# Patient Record
Sex: FEMALE | Race: WHITE | NOT HISPANIC OR LATINO
[De-identification: names, ages, dates, MRNs, and addresses within clinical notes are randomized per-mention and may not be internally consistent; named-entity substitution may affect disease eponyms.]

---

## 2022-07-05 ENCOUNTER — APPOINTMENT (OUTPATIENT)
Dept: CARDIOTHORACIC SURGERY | Facility: CLINIC | Age: 47
End: 2022-07-05

## 2022-07-05 VITALS
OXYGEN SATURATION: 98 % | DIASTOLIC BLOOD PRESSURE: 75 MMHG | SYSTOLIC BLOOD PRESSURE: 117 MMHG | RESPIRATION RATE: 14 BRPM | TEMPERATURE: 98.3 F | HEART RATE: 60 BPM

## 2022-07-05 VITALS — BODY MASS INDEX: 20.89 KG/M2 | WEIGHT: 130 LBS | HEIGHT: 66 IN

## 2022-07-05 DIAGNOSIS — E03.9 HYPOTHYROIDISM, UNSPECIFIED: ICD-10-CM

## 2022-07-05 DIAGNOSIS — Z86.39 PERSONAL HISTORY OF OTHER ENDOCRINE, NUTRITIONAL AND METABOLIC DISEASE: ICD-10-CM

## 2022-07-05 DIAGNOSIS — R42 DIZZINESS AND GIDDINESS: ICD-10-CM

## 2022-07-05 DIAGNOSIS — Z82.49 FAMILY HISTORY OF ISCHEMIC HEART DISEASE AND OTHER DISEASES OF THE CIRCULATORY SYSTEM: ICD-10-CM

## 2022-07-05 DIAGNOSIS — Z95.2 PRESENCE OF PROSTHETIC HEART VALVE: ICD-10-CM

## 2022-07-05 DIAGNOSIS — E78.5 HYPERLIPIDEMIA, UNSPECIFIED: ICD-10-CM

## 2022-07-05 DIAGNOSIS — Q23.1 CONGENITAL INSUFFICIENCY OF AORTIC VALVE: ICD-10-CM

## 2022-07-05 DIAGNOSIS — R00.2 PALPITATIONS: ICD-10-CM

## 2022-07-05 DIAGNOSIS — Z87.09 PERSONAL HISTORY OF OTHER DISEASES OF THE RESPIRATORY SYSTEM: ICD-10-CM

## 2022-07-05 DIAGNOSIS — Z87.11 PERSONAL HISTORY OF PEPTIC ULCER DISEASE: ICD-10-CM

## 2022-07-05 PROCEDURE — 99244 OFF/OP CNSLTJ NEW/EST MOD 40: CPT

## 2022-07-05 RX ORDER — NORETHINDRONE ACETATE AND ETHINYL ESTRADIOL 1MG-20(21)
1-20 KIT ORAL
Qty: 84 | Refills: 0 | Status: ACTIVE | COMMUNITY
Start: 2021-11-22

## 2022-07-05 RX ORDER — LEVOTHYROXINE SODIUM 88 UG/1
88 TABLET ORAL
Qty: 90 | Refills: 0 | Status: ACTIVE | COMMUNITY
Start: 2022-06-24

## 2022-07-06 NOTE — REVIEW OF SYSTEMS
[Feeling Tired] : feeling tired [SOB on Exertion] : shortness of breath during exertion [Dizziness] : dizziness [Anxiety] : anxiety [Negative] : Heme/Lymph [Chest Pain] : no chest pain [Palpitations] : no palpitations [Lower Ext Edema] : no lower extremity edema [Abdominal Pain] : no abdominal pain [Vomiting] : no vomiting [Joint Swelling] : no joint swelling [Skin Lesions] : no skin lesions [Fainting] : no fainting

## 2022-07-06 NOTE — PHYSICAL EXAM
[General Appearance - Alert] : alert [Sclera] : the sclera and conjunctiva were normal [Jugular Venous Distention Increased] : there was no jugular-venous distention [] : no respiratory distress [Respiration, Rhythm And Depth] : normal respiratory rhythm and effort [Auscultation Breath Sounds / Voice Sounds] : lungs were clear to auscultation bilaterally [Prosthetic Aortic Valve] : prosthetic aortic valve heard [II] : a grade 2 [No Pitting Edema] : no pitting edema present [Surgical / Traumatic Scar Sternum] : a scar [Bowel Sounds] : normal bowel sounds [Abdomen Soft] : soft [Cervical Lymph Nodes Enlarged Posterior Bilaterally] : posterior cervical [No CVA Tenderness] : no ~M costovertebral angle tenderness [Abnormal Walk] : normal gait [Skin Color & Pigmentation] : normal skin color and pigmentation [No Focal Deficits] : no focal deficits [Oriented To Time, Place, And Person] : oriented to person, place, and time [Right Carotid Bruit] : no bruit heard over the right carotid [Left Carotid Bruit] : no bruit heard over the left carotid

## 2022-07-06 NOTE — HISTORY OF PRESENT ILLNESS
[FreeTextEntry1] : Molly is a 46 year old female with PMH of Grave's disease (on Synthroid now), HLD, asthma, gastric ulcers? and bicuspid aortic valve s/p AVR in 2008 with Dr. Ata Montes at [facility]. \par \par She follows with Dr. Maxwell Walker for her cardiology care. Recent echocardiogram stress test  on 6/23/2022 demonstrated EF 55%, abnormally functioning porcine bioprosthetic aortic valve, with severe stenosis, AoV 4.83 m/sec, pGr 93.3 mmHg, mGr 53.5 mmHg, LEANDRO 0.7 cm² She was referred for surgical consultation. \par \par She reports "not feeling well" for approximately 1 year ago and reports positional dizziness. She is a  working in a local school district.  She is still working out although cautiously. She reports her symptoms have intensified in recent months. She is a mother to 3 young boys.

## 2022-07-06 NOTE — ASSESSMENT
[FreeTextEntry1] : Molly is a 46 year old female with PMH of Grave's disease (on Synthroid now), HLD, asthma, gastric ulcers? and bicuspid aortic valve s/p AVR in 2008. She follows with Dr. Maxwell Walker for her cardiology care. Recent echocardiogram stress test  on 6/23/2022 demonstrated EF 55%, abnormally functioning porcine bioprosthetic aortic valve, with severe stenosis, AoV 4.83 m/sec, pGr 93.3 mmHg, mGr 53.5 mmHg, LEANDRO 0.7 cm² She was referred for surgical consultation. She reports "not feeling well" for approximately 1 year ago and reports dizziness (NYHA II). She is still working out although cautiously. \par \par She reports her symptoms have intensified in recent months however her  has been ill for the last three years and she has had to put her focus on her  and her family.  \par \par I have reviewed the patient's medical records, diagnostic images during the time of this office consultation and have made the following recommendation. Review of the imaging shows stenosis of bioprosthetic aortic valve which will require surgical intervention. We discussed varying approaches (Ross procedure, Valve in Valve). We discussed PPM risk which I quote a 10%. I would recommend redo sternotomy and aortic valve replacement for now. \par \par All questions were answered and concerns addressed. She would like to proceed with surgery in September due to her family responsibilities. I explained to patient that I would recommend her having surgery sooner (stenotic bioprosthetic aortic valve) due to recent echocardiogram results and her worsening symptomatology. \par \par Patient however is apprehensive due to her three young sons being home for the summer and her  has recently recovered from a prolonged neurological illness.  I instructed patient to keep hydrated during the next two months and restrict her physical activity. If her symptoms worsen I have instructed Molly to call my office for re evaluation and expediting open surgery.  All questions were answered and concerns addressed.    \par \par Plan:\par 1) CTA of Coronaries \par 2) Redo Aortic valve replacement\par \par

## 2022-07-06 NOTE — DATA REVIEWED
[FreeTextEntry1] : Echocardiogram stress test  on 6/23/2022 demonstrated EF 55%, abnormally functioning porcine bioprosthetic aortic valve, with severe stenosis, AoV 4.83 m/sec, pGr 93.3 mmHg, mGr 53.5 mmHg, LEANDRO 0.7 cm²

## 2022-07-06 NOTE — END OF VISIT
[FreeTextEntry2] : I personally performed the services described in the documentation, reviewed the documentation recorded by the scribe in my presence and it accurately and completely records my words and actions.\par \par I, Bre Mathew NP, am scribing for and the presence of Dr. Jeremias Stevenson, the following sections HISTORY OF PRESENT ILLNESS, PAST MEDICAL/FAMILY/SOCIAL HISTORY; REVIEW OF SYSTEMS; VITAL SIGNS; PHYSICAL EXAM; DISPOSITION.\par

## 2022-07-20 ENCOUNTER — OUTPATIENT (OUTPATIENT)
Dept: OUTPATIENT SERVICES | Facility: HOSPITAL | Age: 47
LOS: 1 days | End: 2022-07-20
Payer: COMMERCIAL

## 2022-07-20 ENCOUNTER — APPOINTMENT (OUTPATIENT)
Dept: CARDIOLOGY | Facility: CLINIC | Age: 47
End: 2022-07-20

## 2022-07-20 DIAGNOSIS — Q23.1 CONGENITAL INSUFFICIENCY OF AORTIC VALVE: ICD-10-CM

## 2022-07-20 DIAGNOSIS — Z00.00 ENCOUNTER FOR GENERAL ADULT MEDICAL EXAMINATION WITHOUT ABNORMAL FINDINGS: ICD-10-CM

## 2022-07-20 DIAGNOSIS — R42 DIZZINESS AND GIDDINESS: ICD-10-CM

## 2022-07-20 PROCEDURE — 75574 CT ANGIO HRT W/3D IMAGE: CPT | Mod: 26

## 2022-07-20 PROCEDURE — 75574 CT ANGIO HRT W/3D IMAGE: CPT

## 2022-09-12 ENCOUNTER — OUTPATIENT (OUTPATIENT)
Dept: OUTPATIENT SERVICES | Facility: HOSPITAL | Age: 47
LOS: 1 days | End: 2022-09-12

## 2022-09-12 ENCOUNTER — OUTPATIENT (OUTPATIENT)
Dept: OUTPATIENT SERVICES | Facility: HOSPITAL | Age: 47
LOS: 1 days | End: 2022-09-12
Payer: COMMERCIAL

## 2022-09-12 VITALS
RESPIRATION RATE: 16 BRPM | SYSTOLIC BLOOD PRESSURE: 101 MMHG | HEIGHT: 66 IN | DIASTOLIC BLOOD PRESSURE: 70 MMHG | TEMPERATURE: 98 F | HEART RATE: 67 BPM | WEIGHT: 136.91 LBS | OXYGEN SATURATION: 100 %

## 2022-09-12 DIAGNOSIS — Z87.74 PERSONAL HISTORY OF (CORRECTED) CONGENITAL MALFORMATIONS OF HEART AND CIRCULATORY SYSTEM: Chronic | ICD-10-CM

## 2022-09-12 DIAGNOSIS — Z11.52 ENCOUNTER FOR SCREENING FOR COVID-19: ICD-10-CM

## 2022-09-12 DIAGNOSIS — I35.0 NONRHEUMATIC AORTIC (VALVE) STENOSIS: ICD-10-CM

## 2022-09-12 DIAGNOSIS — Z01.818 ENCOUNTER FOR OTHER PREPROCEDURAL EXAMINATION: ICD-10-CM

## 2022-09-12 DIAGNOSIS — Z95.2 PRESENCE OF PROSTHETIC HEART VALVE: Chronic | ICD-10-CM

## 2022-09-12 DIAGNOSIS — E06.3 AUTOIMMUNE THYROIDITIS: ICD-10-CM

## 2022-09-12 DIAGNOSIS — Z98.891 HISTORY OF UTERINE SCAR FROM PREVIOUS SURGERY: Chronic | ICD-10-CM

## 2022-09-12 DIAGNOSIS — Z87.19 PERSONAL HISTORY OF OTHER DISEASES OF THE DIGESTIVE SYSTEM: ICD-10-CM

## 2022-09-12 LAB
A1C WITH ESTIMATED AVERAGE GLUCOSE RESULT: 5.1 % — SIGNIFICANT CHANGE UP (ref 4–5.6)
ANION GAP SERPL CALC-SCNC: 11 MMOL/L — SIGNIFICANT CHANGE UP (ref 5–17)
BLD GP AB SCN SERPL QL: NEGATIVE — SIGNIFICANT CHANGE UP
BUN SERPL-MCNC: 25 MG/DL — HIGH (ref 7–23)
CALCIUM SERPL-MCNC: 9.3 MG/DL — SIGNIFICANT CHANGE UP (ref 8.4–10.5)
CHLORIDE SERPL-SCNC: 105 MMOL/L — SIGNIFICANT CHANGE UP (ref 96–108)
CO2 SERPL-SCNC: 24 MMOL/L — SIGNIFICANT CHANGE UP (ref 22–31)
CREAT SERPL-MCNC: 0.85 MG/DL — SIGNIFICANT CHANGE UP (ref 0.5–1.3)
EGFR: 86 ML/MIN/1.73M2 — SIGNIFICANT CHANGE UP
ESTIMATED AVERAGE GLUCOSE: 100 MG/DL — SIGNIFICANT CHANGE UP (ref 68–114)
GLUCOSE SERPL-MCNC: 83 MG/DL — SIGNIFICANT CHANGE UP (ref 70–99)
HCT VFR BLD CALC: 42.7 % — SIGNIFICANT CHANGE UP (ref 34.5–45)
HGB BLD-MCNC: 14.7 G/DL — SIGNIFICANT CHANGE UP (ref 11.5–15.5)
MCHC RBC-ENTMCNC: 31.7 PG — SIGNIFICANT CHANGE UP (ref 27–34)
MCHC RBC-ENTMCNC: 34.4 GM/DL — SIGNIFICANT CHANGE UP (ref 32–36)
MCV RBC AUTO: 92 FL — SIGNIFICANT CHANGE UP (ref 80–100)
MRSA PCR RESULT.: SIGNIFICANT CHANGE UP
NRBC # BLD: 0 /100 WBCS — SIGNIFICANT CHANGE UP (ref 0–0)
PLATELET # BLD AUTO: 175 K/UL — SIGNIFICANT CHANGE UP (ref 150–400)
POTASSIUM SERPL-MCNC: 4 MMOL/L — SIGNIFICANT CHANGE UP (ref 3.5–5.3)
POTASSIUM SERPL-SCNC: 4 MMOL/L — SIGNIFICANT CHANGE UP (ref 3.5–5.3)
RBC # BLD: 4.64 M/UL — SIGNIFICANT CHANGE UP (ref 3.8–5.2)
RBC # FLD: 13 % — SIGNIFICANT CHANGE UP (ref 10.3–14.5)
RH IG SCN BLD-IMP: NEGATIVE — SIGNIFICANT CHANGE UP
S AUREUS DNA NOSE QL NAA+PROBE: SIGNIFICANT CHANGE UP
SARS-COV-2 RNA SPEC QL NAA+PROBE: SIGNIFICANT CHANGE UP
SODIUM SERPL-SCNC: 140 MMOL/L — SIGNIFICANT CHANGE UP (ref 135–145)
WBC # BLD: 6.13 K/UL — SIGNIFICANT CHANGE UP (ref 3.8–10.5)
WBC # FLD AUTO: 6.13 K/UL — SIGNIFICANT CHANGE UP (ref 3.8–10.5)

## 2022-09-12 PROCEDURE — 87641 MR-STAPH DNA AMP PROBE: CPT

## 2022-09-12 PROCEDURE — 86901 BLOOD TYPING SEROLOGIC RH(D): CPT

## 2022-09-12 PROCEDURE — G0463: CPT

## 2022-09-12 PROCEDURE — 83036 HEMOGLOBIN GLYCOSYLATED A1C: CPT

## 2022-09-12 PROCEDURE — U0005: CPT

## 2022-09-12 PROCEDURE — U0003: CPT

## 2022-09-12 PROCEDURE — 71046 X-RAY EXAM CHEST 2 VIEWS: CPT | Mod: 26

## 2022-09-12 PROCEDURE — 71046 X-RAY EXAM CHEST 2 VIEWS: CPT

## 2022-09-12 PROCEDURE — 86900 BLOOD TYPING SEROLOGIC ABO: CPT

## 2022-09-12 PROCEDURE — 85027 COMPLETE CBC AUTOMATED: CPT

## 2022-09-12 PROCEDURE — 87640 STAPH A DNA AMP PROBE: CPT

## 2022-09-12 PROCEDURE — C9803: CPT

## 2022-09-12 PROCEDURE — 86850 RBC ANTIBODY SCREEN: CPT

## 2022-09-12 PROCEDURE — 80048 BASIC METABOLIC PNL TOTAL CA: CPT

## 2022-09-12 RX ORDER — CEFUROXIME AXETIL 250 MG
1500 TABLET ORAL ONCE
Refills: 0 | Status: COMPLETED | OUTPATIENT
Start: 2022-09-14 | End: 2022-09-14

## 2022-09-12 NOTE — H&P PST ADULT - NSICDXPASTMEDICALHX_GEN_ALL_CORE_FT
PAST MEDICAL HISTORY:  H/O bicuspid aortic valve      PAST MEDICAL HISTORY:  Asthma well controlled - not taking medication at this time    H/O bicuspid aortic valve     H/O constipation severe - caused by opiods - requires stools softeners when taking pain medicatons    Hashimoto's disease Can only take Synthoid - no generics

## 2022-09-12 NOTE — H&P PST ADULT - LAST ECHOCARDIOGRAM
6/23/22 - EF 50-55%. abnormally functioning porcine bioprosthetic aortic valve, severe stenosis of the bioprosthetic aortic valve

## 2022-09-12 NOTE — H&P PST ADULT - HISTORY OF PRESENT ILLNESS
45 yo female Hashimoto's controlled with synthroid,   symptoms of sob, tiredness, dizziness, weight lifting, cardio and walked 3 miles a day, walks 45 mts.  no covid  male with PMH of Grave's disease (on Synthroid now), HLD, asthma, gastric ulcers? and bicuspid aortic valve s/p AVR in 2008 with Dr. Ata Montes at [facility].     She follows with Dr. Maxwell Walker for her cardiology care. Recent echocardiogram stress test on 6/23/2022 demonstrated EF 55%, abnormally functioning porcine bioprosthetic aortic valve, with severe stenosis, AoV 4.83 m/sec, pGr 93.3 mmHg, mGr 53.5 mmHg, LEANDRO 0.7 cm² She was referred for surgical consultation.     She reports "not feeling well" for approximately 1 year ago and reports positional dizziness. 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 She reports her symptoms have intensified in recent months. She is a mother to 3 young boys.  47 yo female PMH asthma - well controlled - no meds,  Hashimoto's controlled with Synthroid, bicuspid aortic valve - s/p AVR 2008, pt. reports symptoms of SOB, tiredness, dizziness for about a year, last echocardiogram revealed severe stenosis of the bioprosthetic aortic valve, pt. had CT Angio of the heart and coronaries 7/2022 - WNL. Pt. denies COVID-19 infection in 2020 through 2022, denies close contact with anyone that has been ill, no fever, cough, dyspnea in past two weeks.    Pt. had COVID swab done today at [facility]

## 2022-09-12 NOTE — H&P PST ADULT - ASSESSMENT
NICOLASI VTE 2.0 SCORE [CLOT updated 2019]    AGE RELATED RISK FACTORS                                                       MOBILITY RELATED FACTORS  [x ] Age 41-60 years                                            (1 Point)                    [ ] Bed rest                                                        (1 Point)  [ ] Age: 61-74 years                                           (2 Points)                  [ ] Plaster cast                                                   (2 Points)  [ ] Age= 75 years                                              (3 Points)                    [ ] Bed bound for more than 72 hours                 (2 Points)    DISEASE RELATED RISK FACTORS                                               GENDER SPECIFIC FACTORS  [ ] Edema in the lower extremities                       (1 Point)              [ ] Pregnancy                                                     (1 Point)  [ ] Varicose veins                                               (1 Point)                     [ ] Post-partum < 6 weeks                                   (1 Point)             [ x] BMI > 25 Kg/m2                                            (1 Point)                     [ ] Hormonal therapy  or oral contraception          (1 Point)                 [ ] Sepsis (in the previous month)                        (1 Point)               [ ] History of pregnancy complications                 (1 point)  [ ] Pneumonia or serious lung disease                                               [ ] Unexplained or recurrent                     (1 Point)           (in the previous month)                               (1 Point)  [ ] Abnormal pulmonary function test                     (1 Point)                 SURGERY RELATED RISK FACTORS  [ ] Acute myocardial infarction                              (1 Point)               [ ]  Section                                             (1 Point)  [ ] Congestive heart failure (in the previous month)  (1 Point)      [ ] Minor surgery                                                  (1 Point)   [ ] Inflammatory bowel disease                             (1 Point)               [ ] Arthroscopic surgery                                        (2 Points)  [ ] Central venous access                                      (2 Points)                [x ] General surgery lasting more than 45 minutes (2 points)  [ ] Malignancy- Present or previous                   (2 Points)                [ ] Elective arthroplasty                                         (5 points)    [ ] Stroke (in the previous month)                          (5 Points)                                                                                                                                                           HEMATOLOGY RELATED FACTORS                                                 TRAUMA RELATED RISK FACTORS  [ ] Prior episodes of VTE                                     (3 Points)                [ ] Fracture of the hip, pelvis, or leg                       (5 Points)  [ ] Positive family history for VTE                         (3 Points)             [ ] Acute spinal cord injury (in the previous month)  (5 Points)  [ ] Prothrombin 49334 A                                     (3 Points)               [ ] Paralysis  (less than 1 month)                             (5 Points)  [ ] Factor V Leiden                                             (3 Points)                  [ ] Multiple Trauma within 1 month                        (5 Points)  [ ] Lupus anticoagulants                                     (3 Points)                                                           [ ] Anticardiolipin antibodies                               (3 Points)                                                       [ ] High homocysteine in the blood                      (3 Points)                                             [ ] Other congenital or acquired thrombophilia      (3 Points)                                                [ ] Heparin induced thrombocytopenia                  (3 Points)                                     Total Score [    4      ]

## 2022-09-13 ENCOUNTER — TRANSCRIPTION ENCOUNTER (OUTPATIENT)
Age: 47
End: 2022-09-13

## 2022-09-13 PROBLEM — E06.3 AUTOIMMUNE THYROIDITIS: Chronic | Status: ACTIVE | Noted: 2022-09-12

## 2022-09-13 PROBLEM — Z87.19 PERSONAL HISTORY OF OTHER DISEASES OF THE DIGESTIVE SYSTEM: Chronic | Status: ACTIVE | Noted: 2022-09-12

## 2022-09-13 PROBLEM — Z87.74 PERSONAL HISTORY OF (CORRECTED) CONGENITAL MALFORMATIONS OF HEART AND CIRCULATORY SYSTEM: Chronic | Status: ACTIVE | Noted: 2022-09-12

## 2022-09-13 PROBLEM — J45.909 UNSPECIFIED ASTHMA, UNCOMPLICATED: Chronic | Status: ACTIVE | Noted: 2022-09-12

## 2022-09-14 ENCOUNTER — RESULT REVIEW (OUTPATIENT)
Age: 47
End: 2022-09-14

## 2022-09-14 ENCOUNTER — INPATIENT (INPATIENT)
Facility: HOSPITAL | Age: 47
LOS: 5 days | Discharge: HOME HEALTH | DRG: 219 | End: 2022-09-20
Attending: THORACIC SURGERY (CARDIOTHORACIC VASCULAR SURGERY) | Admitting: THORACIC SURGERY (CARDIOTHORACIC VASCULAR SURGERY)
Payer: COMMERCIAL

## 2022-09-14 ENCOUNTER — APPOINTMENT (OUTPATIENT)
Dept: CARDIOTHORACIC SURGERY | Facility: HOSPITAL | Age: 47
End: 2022-09-14

## 2022-09-14 VITALS
TEMPERATURE: 98 F | OXYGEN SATURATION: 100 % | DIASTOLIC BLOOD PRESSURE: 68 MMHG | RESPIRATION RATE: 16 BRPM | HEART RATE: 55 BPM | SYSTOLIC BLOOD PRESSURE: 106 MMHG | WEIGHT: 139.11 LBS | HEIGHT: 66 IN

## 2022-09-14 DIAGNOSIS — Z87.74 PERSONAL HISTORY OF (CORRECTED) CONGENITAL MALFORMATIONS OF HEART AND CIRCULATORY SYSTEM: Chronic | ICD-10-CM

## 2022-09-14 DIAGNOSIS — I35.0 NONRHEUMATIC AORTIC (VALVE) STENOSIS: ICD-10-CM

## 2022-09-14 DIAGNOSIS — Z98.891 HISTORY OF UTERINE SCAR FROM PREVIOUS SURGERY: Chronic | ICD-10-CM

## 2022-09-14 LAB
ALBUMIN SERPL ELPH-MCNC: 3 G/DL — LOW (ref 3.3–5)
ALP SERPL-CCNC: 21 U/L — LOW (ref 40–120)
ALT FLD-CCNC: 11 U/L — SIGNIFICANT CHANGE UP (ref 10–45)
ANION GAP SERPL CALC-SCNC: 11 MMOL/L — SIGNIFICANT CHANGE UP (ref 5–17)
APTT BLD: 25.6 SEC — LOW (ref 27.5–35.5)
AST SERPL-CCNC: 27 U/L — SIGNIFICANT CHANGE UP (ref 10–40)
BASE EXCESS BLDA CALC-SCNC: -2.6 MMOL/L — LOW (ref -2–3)
BASE EXCESS BLDV CALC-SCNC: -0.1 MMOL/L — SIGNIFICANT CHANGE UP (ref -2–3)
BASE EXCESS BLDV CALC-SCNC: -1.3 MMOL/L — SIGNIFICANT CHANGE UP (ref -2–3)
BASE EXCESS BLDV CALC-SCNC: -1.4 MMOL/L — SIGNIFICANT CHANGE UP (ref -2–3)
BASE EXCESS BLDV CALC-SCNC: -1.4 MMOL/L — SIGNIFICANT CHANGE UP (ref -2–3)
BASE EXCESS BLDV CALC-SCNC: -1.5 MMOL/L — SIGNIFICANT CHANGE UP (ref -2–3)
BASE EXCESS BLDV CALC-SCNC: -3 MMOL/L — LOW (ref -2–3)
BASOPHILS # BLD AUTO: 0.03 K/UL — SIGNIFICANT CHANGE UP (ref 0–0.2)
BASOPHILS NFR BLD AUTO: 0.2 % — SIGNIFICANT CHANGE UP (ref 0–2)
BILIRUB SERPL-MCNC: 0.8 MG/DL — SIGNIFICANT CHANGE UP (ref 0.2–1.2)
BLOOD GAS VENOUS - CREATININE: SIGNIFICANT CHANGE UP MG/DL (ref 0.5–1.3)
BUN SERPL-MCNC: 20 MG/DL — SIGNIFICANT CHANGE UP (ref 7–23)
CA-I SERPL-SCNC: 0.75 MMOL/L — LOW (ref 1.15–1.33)
CA-I SERPL-SCNC: 0.77 MMOL/L — LOW (ref 1.15–1.33)
CA-I SERPL-SCNC: 0.81 MMOL/L — LOW (ref 1.15–1.33)
CA-I SERPL-SCNC: 0.82 MMOL/L — LOW (ref 1.15–1.33)
CA-I SERPL-SCNC: 0.84 MMOL/L — LOW (ref 1.15–1.33)
CA-I SERPL-SCNC: 0.87 MMOL/L — LOW (ref 1.15–1.33)
CALCIUM SERPL-MCNC: 8.6 MG/DL — SIGNIFICANT CHANGE UP (ref 8.4–10.5)
CHLORIDE BLDV-SCNC: 103 MMOL/L — SIGNIFICANT CHANGE UP (ref 96–108)
CHLORIDE BLDV-SCNC: 105 MMOL/L — SIGNIFICANT CHANGE UP (ref 96–108)
CHLORIDE BLDV-SCNC: 106 MMOL/L — SIGNIFICANT CHANGE UP (ref 96–108)
CHLORIDE SERPL-SCNC: 108 MMOL/L — SIGNIFICANT CHANGE UP (ref 96–108)
CO2 BLDA-SCNC: 24 MMOL/L — SIGNIFICANT CHANGE UP (ref 19–24)
CO2 BLDV-SCNC: 23 MMOL/L — SIGNIFICANT CHANGE UP (ref 22–26)
CO2 BLDV-SCNC: 24 MMOL/L — SIGNIFICANT CHANGE UP (ref 22–26)
CO2 BLDV-SCNC: 24 MMOL/L — SIGNIFICANT CHANGE UP (ref 22–26)
CO2 BLDV-SCNC: 26 MMOL/L — SIGNIFICANT CHANGE UP (ref 22–26)
CO2 BLDV-SCNC: 27 MMOL/L — HIGH (ref 22–26)
CO2 BLDV-SCNC: 30 MMOL/L — HIGH (ref 22–26)
CO2 SERPL-SCNC: 23 MMOL/L — SIGNIFICANT CHANGE UP (ref 22–31)
CREAT SERPL-MCNC: 0.73 MG/DL — SIGNIFICANT CHANGE UP (ref 0.5–1.3)
EGFR: 103 ML/MIN/1.73M2 — SIGNIFICANT CHANGE UP
EOSINOPHIL # BLD AUTO: 0.02 K/UL — SIGNIFICANT CHANGE UP (ref 0–0.5)
EOSINOPHIL NFR BLD AUTO: 0.2 % — SIGNIFICANT CHANGE UP (ref 0–6)
FIBRINOGEN PPP-MCNC: 294 MG/DL — LOW (ref 330–520)
GAS PNL BLDA: SIGNIFICANT CHANGE UP
GAS PNL BLDV: 137 MMOL/L — SIGNIFICANT CHANGE UP (ref 136–145)
GAS PNL BLDV: 138 MMOL/L — SIGNIFICANT CHANGE UP (ref 136–145)
GAS PNL BLDV: 139 MMOL/L — SIGNIFICANT CHANGE UP (ref 136–145)
GAS PNL BLDV: SIGNIFICANT CHANGE UP
GLUCOSE BLDV-MCNC: 106 MG/DL — HIGH (ref 70–99)
GLUCOSE BLDV-MCNC: 111 MG/DL — HIGH (ref 70–99)
GLUCOSE BLDV-MCNC: 134 MG/DL — HIGH (ref 70–99)
GLUCOSE BLDV-MCNC: 149 MG/DL — HIGH (ref 70–99)
GLUCOSE BLDV-MCNC: 150 MG/DL — HIGH (ref 70–99)
GLUCOSE BLDV-MCNC: 89 MG/DL — SIGNIFICANT CHANGE UP (ref 70–99)
GLUCOSE SERPL-MCNC: 121 MG/DL — HIGH (ref 70–99)
HCG UR QL: NEGATIVE — SIGNIFICANT CHANGE UP
HCO3 BLDA-SCNC: 23 MMOL/L — SIGNIFICANT CHANGE UP (ref 21–28)
HCO3 BLDV-SCNC: 22 MMOL/L — SIGNIFICANT CHANGE UP (ref 22–29)
HCO3 BLDV-SCNC: 23 MMOL/L — SIGNIFICANT CHANGE UP (ref 22–29)
HCO3 BLDV-SCNC: 23 MMOL/L — SIGNIFICANT CHANGE UP (ref 22–29)
HCO3 BLDV-SCNC: 24 MMOL/L — SIGNIFICANT CHANGE UP (ref 22–29)
HCO3 BLDV-SCNC: 26 MMOL/L — SIGNIFICANT CHANGE UP (ref 22–29)
HCO3 BLDV-SCNC: 28 MMOL/L — SIGNIFICANT CHANGE UP (ref 22–29)
HCT VFR BLD CALC: 29.8 % — LOW (ref 34.5–45)
HCT VFR BLDA CALC: 30 % — LOW (ref 34.5–46.5)
HCT VFR BLDA CALC: 30 % — LOW (ref 34.5–46.5)
HCT VFR BLDA CALC: 31 % — LOW (ref 34.5–46.5)
HCT VFR BLDA CALC: 31 % — LOW (ref 34.5–46.5)
HCT VFR BLDA CALC: 32 % — LOW (ref 34.5–46.5)
HCT VFR BLDA CALC: 33 % — LOW (ref 34.5–46.5)
HEPARINASE TEG R TIME: 5.8 MIN — SIGNIFICANT CHANGE UP (ref 4.3–8.3)
HEPARINASE TEG R TIME: 7.5 MIN — SIGNIFICANT CHANGE UP (ref 4.3–8.3)
HGB BLD CALC-MCNC: 10.2 G/DL — LOW (ref 11.7–16.1)
HGB BLD CALC-MCNC: 10.4 G/DL — LOW (ref 11.7–16.1)
HGB BLD CALC-MCNC: 10.8 G/DL — LOW (ref 11.7–16.1)
HGB BLD CALC-MCNC: 10.9 G/DL — LOW (ref 11.7–16.1)
HGB BLD CALC-MCNC: 9.9 G/DL — LOW (ref 11.7–16.1)
HGB BLD CALC-MCNC: 9.9 G/DL — LOW (ref 11.7–16.1)
HGB BLD-MCNC: 10.3 G/DL — LOW (ref 11.5–15.5)
HOROWITZ INDEX BLDA+IHG-RTO: 40 — SIGNIFICANT CHANGE UP
IMM GRANULOCYTES NFR BLD AUTO: 0.8 % — SIGNIFICANT CHANGE UP (ref 0–1.5)
INR BLD: 1.15 RATIO — SIGNIFICANT CHANGE UP (ref 0.88–1.16)
LACTATE BLDV-MCNC: 0.6 MMOL/L — SIGNIFICANT CHANGE UP (ref 0.5–2)
LACTATE BLDV-MCNC: 0.7 MMOL/L — SIGNIFICANT CHANGE UP (ref 0.5–2)
LACTATE BLDV-MCNC: 0.8 MMOL/L — SIGNIFICANT CHANGE UP (ref 0.5–2)
LACTATE BLDV-MCNC: 0.9 MMOL/L — SIGNIFICANT CHANGE UP (ref 0.5–2)
LYMPHOCYTES # BLD AUTO: 1.06 K/UL — SIGNIFICANT CHANGE UP (ref 1–3.3)
LYMPHOCYTES # BLD AUTO: 8.6 % — LOW (ref 13–44)
MAGNESIUM SERPL-MCNC: 3.3 MG/DL — HIGH (ref 1.6–2.6)
MCHC RBC-ENTMCNC: 31.4 PG — SIGNIFICANT CHANGE UP (ref 27–34)
MCHC RBC-ENTMCNC: 34.6 GM/DL — SIGNIFICANT CHANGE UP (ref 32–36)
MCV RBC AUTO: 90.9 FL — SIGNIFICANT CHANGE UP (ref 80–100)
MONOCYTES # BLD AUTO: 0.33 K/UL — SIGNIFICANT CHANGE UP (ref 0–0.9)
MONOCYTES NFR BLD AUTO: 2.7 % — SIGNIFICANT CHANGE UP (ref 2–14)
NEUTROPHILS # BLD AUTO: 10.75 K/UL — HIGH (ref 1.8–7.4)
NEUTROPHILS NFR BLD AUTO: 87.5 % — HIGH (ref 43–77)
NRBC # BLD: 0 /100 WBCS — SIGNIFICANT CHANGE UP (ref 0–0)
PCO2 BLDA: 43 MMHG — SIGNIFICANT CHANGE UP (ref 32–45)
PCO2 BLDV: 34 MMHG — LOW (ref 39–42)
PCO2 BLDV: 35 MMHG — LOW (ref 39–42)
PCO2 BLDV: 38 MMHG — LOW (ref 39–42)
PCO2 BLDV: 44 MMHG — HIGH (ref 39–42)
PCO2 BLDV: 53 MMHG — HIGH (ref 39–42)
PCO2 BLDV: 62 MMHG — HIGH (ref 39–42)
PH BLDA: 7.34 — LOW (ref 7.35–7.45)
PH BLDV: 7.26 — LOW (ref 7.32–7.43)
PH BLDV: 7.29 — LOW (ref 7.32–7.43)
PH BLDV: 7.35 — SIGNIFICANT CHANGE UP (ref 7.32–7.43)
PH BLDV: 7.37 — SIGNIFICANT CHANGE UP (ref 7.32–7.43)
PH BLDV: 7.42 — SIGNIFICANT CHANGE UP (ref 7.32–7.43)
PH BLDV: 7.43 — SIGNIFICANT CHANGE UP (ref 7.32–7.43)
PHOSPHATE SERPL-MCNC: 3.2 MG/DL — SIGNIFICANT CHANGE UP (ref 2.5–4.5)
PLATELET # BLD AUTO: 122 K/UL — LOW (ref 150–400)
PLATELET MAPPING ACTF MAX AMPLITUDE: 7.7 MM — SIGNIFICANT CHANGE UP (ref 2–19)
PLATELET MAPPING ADP MAXIMUM AMPLITUDE: 51.7 MM — SIGNIFICANT CHANGE UP (ref 45–69)
PLATELET MAPPING ADP PERCENT INHIBITION: 12.5 % — SIGNIFICANT CHANGE UP (ref 0–17)
PLATELET MAPPING HKH MAXIMUM AMPLITUDE: 58 MM — SIGNIFICANT CHANGE UP (ref 53–68)
PO2 BLDA: 177 MMHG — HIGH (ref 83–108)
PO2 BLDV: 103 MMHG — HIGH (ref 25–45)
PO2 BLDV: 115 MMHG — HIGH (ref 25–45)
PO2 BLDV: 141 MMHG — HIGH (ref 25–45)
PO2 BLDV: 63 MMHG — HIGH (ref 25–45)
PO2 BLDV: 67 MMHG — HIGH (ref 25–45)
PO2 BLDV: 85 MMHG — HIGH (ref 25–45)
POTASSIUM BLDV-SCNC: 3.5 MMOL/L — SIGNIFICANT CHANGE UP (ref 3.5–5.1)
POTASSIUM BLDV-SCNC: 4.1 MMOL/L — SIGNIFICANT CHANGE UP (ref 3.5–5.1)
POTASSIUM BLDV-SCNC: 4.2 MMOL/L — SIGNIFICANT CHANGE UP (ref 3.5–5.1)
POTASSIUM BLDV-SCNC: 4.6 MMOL/L — SIGNIFICANT CHANGE UP (ref 3.5–5.1)
POTASSIUM BLDV-SCNC: 5.1 MMOL/L — SIGNIFICANT CHANGE UP (ref 3.5–5.1)
POTASSIUM BLDV-SCNC: 5.8 MMOL/L — HIGH (ref 3.5–5.1)
POTASSIUM SERPL-MCNC: 4.1 MMOL/L — SIGNIFICANT CHANGE UP (ref 3.5–5.3)
POTASSIUM SERPL-SCNC: 4.1 MMOL/L — SIGNIFICANT CHANGE UP (ref 3.5–5.3)
PROT SERPL-MCNC: 4.5 G/DL — LOW (ref 6–8.3)
PROTHROM AB SERPL-ACNC: 13.3 SEC — SIGNIFICANT CHANGE UP (ref 10.5–13.4)
RAPIDTEG MAXIMUM AMPLITUDE: 42.8 MM (ref 52–70)
RAPIDTEG MAXIMUM AMPLITUDE: 55.8 MM — SIGNIFICANT CHANGE UP (ref 52–70)
RBC # BLD: 3.28 M/UL — LOW (ref 3.8–5.2)
RBC # FLD: 12.8 % — SIGNIFICANT CHANGE UP (ref 10.3–14.5)
RH IG SCN BLD-IMP: NEGATIVE — SIGNIFICANT CHANGE UP
SAO2 % BLDA: 99.8 % — HIGH (ref 94–98)
SAO2 % BLDV: 100 % — HIGH (ref 67–88)
SAO2 % BLDV: 92.6 % — HIGH (ref 67–88)
SAO2 % BLDV: 94.9 % — HIGH (ref 67–88)
SAO2 % BLDV: 98.6 % — HIGH (ref 67–88)
SAO2 % BLDV: 99.3 % — HIGH (ref 67–88)
SAO2 % BLDV: 99.6 % — HIGH (ref 67–88)
SODIUM SERPL-SCNC: 142 MMOL/L — SIGNIFICANT CHANGE UP (ref 135–145)
TEG FUNCTIONAL FIBRINOGEN: 13.1 MM (ref 15–32)
TEG FUNCTIONAL FIBRINOGEN: 17.5 MM — SIGNIFICANT CHANGE UP (ref 15–32)
TEG MAXIMUM AMPLITUDE: 49.2 MM (ref 52–69)
TEG MAXIMUM AMPLITUDE: 55.4 MM — SIGNIFICANT CHANGE UP (ref 52–69)
TEG REACTION TIME: 6.7 MIN — SIGNIFICANT CHANGE UP (ref 4.6–9.1)
TEG REACTION TIME: 8.2 MIN — SIGNIFICANT CHANGE UP (ref 4.6–9.1)
WBC # BLD: 12.29 K/UL — HIGH (ref 3.8–10.5)
WBC # FLD AUTO: 12.29 K/UL — HIGH (ref 3.8–10.5)

## 2022-09-14 PROCEDURE — 33530 CORONARY ARTERY BYPASS/REOP: CPT | Mod: 59

## 2022-09-14 PROCEDURE — 71045 X-RAY EXAM CHEST 1 VIEW: CPT | Mod: 26

## 2022-09-14 PROCEDURE — 88300 SURGICAL PATH GROSS: CPT | Mod: 26,59

## 2022-09-14 PROCEDURE — 33405 REPLACEMENT AORTIC VALVE OPN: CPT

## 2022-09-14 PROCEDURE — 88304 TISSUE EXAM BY PATHOLOGIST: CPT | Mod: 26

## 2022-09-14 PROCEDURE — 99291 CRITICAL CARE FIRST HOUR: CPT

## 2022-09-14 PROCEDURE — 33859 AS-AORT GRF F/DS OTH/THN DSJ: CPT

## 2022-09-14 DEVICE — LIGATING CLIPS WECK HORIZON SMALL-WIDE (RED) 24: Type: IMPLANTABLE DEVICE | Status: FUNCTIONAL

## 2022-09-14 DEVICE — KIT A-LINE 1LUM 20G X 12CM SAFE KIT: Type: IMPLANTABLE DEVICE | Status: FUNCTIONAL

## 2022-09-14 DEVICE — SURGICEL 2 X 14": Type: IMPLANTABLE DEVICE | Status: FUNCTIONAL

## 2022-09-14 DEVICE — LIGATING CLIPS WECK HORIZON MEDIUM (BLUE) 24: Type: IMPLANTABLE DEVICE | Status: FUNCTIONAL

## 2022-09-14 DEVICE — CANNULA AORTIC PERFUSION EZ GLIDE STRAIGHT 21FR X 35CM VENTED: Type: IMPLANTABLE DEVICE | Status: FUNCTIONAL

## 2022-09-14 DEVICE — PACING WIRE WHITE M-24 BAREWIRE 37MM X 89MM: Type: IMPLANTABLE DEVICE | Status: FUNCTIONAL

## 2022-09-14 DEVICE — CANNULA VENOUS 1 STAGE STRAIGHT 32FR X 3/8": Type: IMPLANTABLE DEVICE | Status: FUNCTIONAL

## 2022-09-14 DEVICE — FELT PTFE 6 X 6": Type: IMPLANTABLE DEVICE | Status: FUNCTIONAL

## 2022-09-14 DEVICE — VALVE AORTIC INSPIRIS RESILIA 25MM: Type: IMPLANTABLE DEVICE | Status: FUNCTIONAL

## 2022-09-14 DEVICE — KIT CVC 2LUM MAC 9FR CHG: Type: IMPLANTABLE DEVICE | Status: FUNCTIONAL

## 2022-09-14 DEVICE — IMPLANTABLE DEVICE: Type: IMPLANTABLE DEVICE | Status: FUNCTIONAL

## 2022-09-14 DEVICE — BONE WAX 2.5GM: Type: IMPLANTABLE DEVICE | Status: FUNCTIONAL

## 2022-09-14 DEVICE — CANNULA AORTIC ROOT 14G X 14CM FLANGED: Type: IMPLANTABLE DEVICE | Status: FUNCTIONAL

## 2022-09-14 DEVICE — CATH VENT LEFT HEART SILICONE 16FR NON-VENTED: Type: IMPLANTABLE DEVICE | Status: FUNCTIONAL

## 2022-09-14 DEVICE — CANNULA VENOUS 2 STAGE LIGHTHOUSE TIP 28-38FR X 1/2" NON-VENTED: Type: IMPLANTABLE DEVICE | Status: FUNCTIONAL

## 2022-09-14 DEVICE — CANNULA RCSP 15FR X 12".5" MANUAL-INFLATE CUFF WITH GUIDEWIRE STYLET: Type: IMPLANTABLE DEVICE | Status: FUNCTIONAL

## 2022-09-14 DEVICE — MEDIASTINAL CATH DRAIN 9MM: Type: IMPLANTABLE DEVICE | Status: FUNCTIONAL

## 2022-09-14 DEVICE — SURGICEL FIBRILLAR 2 X 4": Type: IMPLANTABLE DEVICE | Status: FUNCTIONAL

## 2022-09-14 RX ORDER — ASPIRIN/CALCIUM CARB/MAGNESIUM 324 MG
325 TABLET ORAL DAILY
Refills: 0 | Status: DISCONTINUED | OUTPATIENT
Start: 2022-09-14 | End: 2022-09-14

## 2022-09-14 RX ORDER — HYDROMORPHONE HYDROCHLORIDE 2 MG/ML
0.5 INJECTION INTRAMUSCULAR; INTRAVENOUS; SUBCUTANEOUS ONCE
Refills: 0 | Status: DISCONTINUED | OUTPATIENT
Start: 2022-09-14 | End: 2022-09-14

## 2022-09-14 RX ORDER — ONDANSETRON 8 MG/1
4 TABLET, FILM COATED ORAL ONCE
Refills: 0 | Status: COMPLETED | OUTPATIENT
Start: 2022-09-14 | End: 2022-09-14

## 2022-09-14 RX ORDER — ACETAMINOPHEN 500 MG
650 TABLET ORAL EVERY 6 HOURS
Refills: 0 | Status: COMPLETED | OUTPATIENT
Start: 2022-09-14 | End: 2022-09-17

## 2022-09-14 RX ORDER — LEVOTHYROXINE SODIUM 125 MCG
88 TABLET ORAL DAILY
Refills: 0 | Status: DISCONTINUED | OUTPATIENT
Start: 2022-09-15 | End: 2022-09-20

## 2022-09-14 RX ORDER — ALBUMIN HUMAN 25 %
250 VIAL (ML) INTRAVENOUS
Refills: 0 | Status: COMPLETED | OUTPATIENT
Start: 2022-09-14 | End: 2022-09-14

## 2022-09-14 RX ORDER — DEXTROSE 50 % IN WATER 50 %
25 SYRINGE (ML) INTRAVENOUS
Refills: 0 | Status: DISCONTINUED | OUTPATIENT
Start: 2022-09-14 | End: 2022-09-14

## 2022-09-14 RX ORDER — SODIUM CHLORIDE 9 MG/ML
1000 INJECTION INTRAMUSCULAR; INTRAVENOUS; SUBCUTANEOUS
Refills: 0 | Status: DISCONTINUED | OUTPATIENT
Start: 2022-09-14 | End: 2022-09-20

## 2022-09-14 RX ORDER — OXYCODONE HYDROCHLORIDE 5 MG/1
5 TABLET ORAL EVERY 4 HOURS
Refills: 0 | Status: DISCONTINUED | OUTPATIENT
Start: 2022-09-14 | End: 2022-09-16

## 2022-09-14 RX ORDER — AMIODARONE HYDROCHLORIDE 400 MG/1
400 TABLET ORAL
Refills: 0 | Status: COMPLETED | OUTPATIENT
Start: 2022-09-14 | End: 2022-09-17

## 2022-09-14 RX ORDER — POTASSIUM CHLORIDE 20 MEQ
10 PACKET (EA) ORAL
Refills: 0 | Status: COMPLETED | OUTPATIENT
Start: 2022-09-14 | End: 2022-09-14

## 2022-09-14 RX ORDER — CHLORHEXIDINE GLUCONATE 213 G/1000ML
15 SOLUTION TOPICAL EVERY 12 HOURS
Refills: 0 | Status: DISCONTINUED | OUTPATIENT
Start: 2022-09-14 | End: 2022-09-14

## 2022-09-14 RX ORDER — PANTOPRAZOLE SODIUM 20 MG/1
40 TABLET, DELAYED RELEASE ORAL
Refills: 0 | Status: DISCONTINUED | OUTPATIENT
Start: 2022-09-14 | End: 2022-09-20

## 2022-09-14 RX ORDER — SODIUM CHLORIDE 9 MG/ML
3 INJECTION INTRAMUSCULAR; INTRAVENOUS; SUBCUTANEOUS EVERY 8 HOURS
Refills: 0 | Status: DISCONTINUED | OUTPATIENT
Start: 2022-09-14 | End: 2022-09-14

## 2022-09-14 RX ORDER — ASPIRIN/CALCIUM CARB/MAGNESIUM 324 MG
300 TABLET ORAL ONCE
Refills: 0 | Status: DISCONTINUED | OUTPATIENT
Start: 2022-09-14 | End: 2022-09-14

## 2022-09-14 RX ORDER — ACETAMINOPHEN 500 MG
650 TABLET ORAL EVERY 6 HOURS
Refills: 0 | Status: DISCONTINUED | OUTPATIENT
Start: 2022-09-17 | End: 2022-09-20

## 2022-09-14 RX ORDER — ASCORBIC ACID 60 MG
500 TABLET,CHEWABLE ORAL
Refills: 0 | Status: COMPLETED | OUTPATIENT
Start: 2022-09-14 | End: 2022-09-19

## 2022-09-14 RX ORDER — MEPERIDINE HYDROCHLORIDE 50 MG/ML
25 INJECTION INTRAMUSCULAR; INTRAVENOUS; SUBCUTANEOUS ONCE
Refills: 0 | Status: DISCONTINUED | OUTPATIENT
Start: 2022-09-14 | End: 2022-09-14

## 2022-09-14 RX ORDER — KETOROLAC TROMETHAMINE 30 MG/ML
30 SYRINGE (ML) INJECTION ONCE
Refills: 0 | Status: DISCONTINUED | OUTPATIENT
Start: 2022-09-14 | End: 2022-09-14

## 2022-09-14 RX ORDER — NICARDIPINE HYDROCHLORIDE 30 MG/1
5 CAPSULE, EXTENDED RELEASE ORAL
Qty: 40 | Refills: 0 | Status: DISCONTINUED | OUTPATIENT
Start: 2022-09-14 | End: 2022-09-15

## 2022-09-14 RX ORDER — SODIUM CHLORIDE 9 MG/ML
500 INJECTION, SOLUTION INTRAVENOUS ONCE
Refills: 0 | Status: COMPLETED | OUTPATIENT
Start: 2022-09-14 | End: 2022-09-14

## 2022-09-14 RX ORDER — ACETAMINOPHEN 500 MG
1000 TABLET ORAL ONCE
Refills: 0 | Status: COMPLETED | OUTPATIENT
Start: 2022-09-14 | End: 2022-09-14

## 2022-09-14 RX ORDER — POTASSIUM CHLORIDE 20 MEQ
10 PACKET (EA) ORAL
Refills: 0 | Status: DISCONTINUED | OUTPATIENT
Start: 2022-09-14 | End: 2022-09-14

## 2022-09-14 RX ORDER — SODIUM CHLORIDE 9 MG/ML
100 INJECTION, SOLUTION INTRAVENOUS ONCE
Refills: 0 | Status: DISCONTINUED | OUTPATIENT
Start: 2022-09-14 | End: 2022-09-14

## 2022-09-14 RX ORDER — DEXTROSE 50 % IN WATER 50 %
50 SYRINGE (ML) INTRAVENOUS
Refills: 0 | Status: DISCONTINUED | OUTPATIENT
Start: 2022-09-14 | End: 2022-09-14

## 2022-09-14 RX ORDER — SODIUM CHLORIDE 9 MG/ML
1500 INJECTION, SOLUTION INTRAVENOUS ONCE
Refills: 0 | Status: COMPLETED | OUTPATIENT
Start: 2022-09-14 | End: 2022-09-14

## 2022-09-14 RX ORDER — CHLORHEXIDINE GLUCONATE 213 G/1000ML
1 SOLUTION TOPICAL DAILY
Refills: 0 | Status: DISCONTINUED | OUTPATIENT
Start: 2022-09-14 | End: 2022-09-20

## 2022-09-14 RX ORDER — CALCIUM GLUCONATE 100 MG/ML
1 VIAL (ML) INTRAVENOUS ONCE
Refills: 0 | Status: COMPLETED | OUTPATIENT
Start: 2022-09-14 | End: 2022-09-14

## 2022-09-14 RX ORDER — DEXMEDETOMIDINE HYDROCHLORIDE IN 0.9% SODIUM CHLORIDE 4 UG/ML
0.4 INJECTION INTRAVENOUS
Qty: 200 | Refills: 0 | Status: DISCONTINUED | OUTPATIENT
Start: 2022-09-14 | End: 2022-09-14

## 2022-09-14 RX ORDER — HYDROMORPHONE HYDROCHLORIDE 2 MG/ML
0.5 INJECTION INTRAMUSCULAR; INTRAVENOUS; SUBCUTANEOUS ONCE
Refills: 0 | Status: DISCONTINUED | OUTPATIENT
Start: 2022-09-14 | End: 2022-09-15

## 2022-09-14 RX ORDER — POLYETHYLENE GLYCOL 3350 17 G/17G
17 POWDER, FOR SOLUTION ORAL DAILY
Refills: 0 | Status: DISCONTINUED | OUTPATIENT
Start: 2022-09-15 | End: 2022-09-20

## 2022-09-14 RX ORDER — CEFUROXIME AXETIL 250 MG
1500 TABLET ORAL EVERY 8 HOURS
Refills: 0 | Status: COMPLETED | OUTPATIENT
Start: 2022-09-14 | End: 2022-09-15

## 2022-09-14 RX ORDER — METOCLOPRAMIDE HCL 10 MG
10 TABLET ORAL EVERY 8 HOURS
Refills: 0 | Status: COMPLETED | OUTPATIENT
Start: 2022-09-14 | End: 2022-09-16

## 2022-09-14 RX ORDER — ALBUMIN HUMAN 25 %
250 VIAL (ML) INTRAVENOUS ONCE
Refills: 0 | Status: COMPLETED | OUTPATIENT
Start: 2022-09-14 | End: 2022-09-14

## 2022-09-14 RX ORDER — INSULIN HUMAN 100 [IU]/ML
3 INJECTION, SOLUTION SUBCUTANEOUS
Qty: 100 | Refills: 0 | Status: DISCONTINUED | OUTPATIENT
Start: 2022-09-14 | End: 2022-09-15

## 2022-09-14 RX ORDER — GABAPENTIN 400 MG/1
100 CAPSULE ORAL EVERY 8 HOURS
Refills: 0 | Status: COMPLETED | OUTPATIENT
Start: 2022-09-14 | End: 2022-09-19

## 2022-09-14 RX ORDER — SODIUM CHLORIDE 9 MG/ML
1000 INJECTION, SOLUTION INTRAVENOUS
Refills: 0 | Status: DISCONTINUED | OUTPATIENT
Start: 2022-09-14 | End: 2022-09-15

## 2022-09-14 RX ORDER — CHLORHEXIDINE GLUCONATE 213 G/1000ML
1 SOLUTION TOPICAL ONCE
Refills: 0 | Status: DISCONTINUED | OUTPATIENT
Start: 2022-09-14 | End: 2022-09-14

## 2022-09-14 RX ORDER — PANTOPRAZOLE SODIUM 20 MG/1
40 TABLET, DELAYED RELEASE ORAL DAILY
Refills: 0 | Status: DISCONTINUED | OUTPATIENT
Start: 2022-09-14 | End: 2022-09-14

## 2022-09-14 RX ORDER — SENNA PLUS 8.6 MG/1
2 TABLET ORAL AT BEDTIME
Refills: 0 | Status: DISCONTINUED | OUTPATIENT
Start: 2022-09-15 | End: 2022-09-20

## 2022-09-14 RX ORDER — LIDOCAINE HCL 20 MG/ML
0.2 VIAL (ML) INJECTION ONCE
Refills: 0 | Status: DISCONTINUED | OUTPATIENT
Start: 2022-09-14 | End: 2022-09-14

## 2022-09-14 RX ORDER — OXYCODONE HYDROCHLORIDE 5 MG/1
10 TABLET ORAL EVERY 4 HOURS
Refills: 0 | Status: DISCONTINUED | OUTPATIENT
Start: 2022-09-14 | End: 2022-09-16

## 2022-09-14 RX ORDER — HYDROMORPHONE HYDROCHLORIDE 2 MG/ML
0.5 INJECTION INTRAMUSCULAR; INTRAVENOUS; SUBCUTANEOUS EVERY 6 HOURS
Refills: 0 | Status: DISCONTINUED | OUTPATIENT
Start: 2022-09-14 | End: 2022-09-15

## 2022-09-14 RX ADMIN — Medication 125 MILLILITER(S): at 21:55

## 2022-09-14 RX ADMIN — Medication 100 MILLIEQUIVALENT(S): at 14:47

## 2022-09-14 RX ADMIN — Medication 125 MILLILITER(S): at 23:14

## 2022-09-14 RX ADMIN — HYDROMORPHONE HYDROCHLORIDE 0.5 MILLIGRAM(S): 2 INJECTION INTRAMUSCULAR; INTRAVENOUS; SUBCUTANEOUS at 22:33

## 2022-09-14 RX ADMIN — Medication 100 MILLIEQUIVALENT(S): at 21:23

## 2022-09-14 RX ADMIN — HYDROMORPHONE HYDROCHLORIDE 0.5 MILLIGRAM(S): 2 INJECTION INTRAMUSCULAR; INTRAVENOUS; SUBCUTANEOUS at 22:18

## 2022-09-14 RX ADMIN — SODIUM CHLORIDE 3000 MILLILITER(S): 9 INJECTION, SOLUTION INTRAVENOUS at 15:47

## 2022-09-14 RX ADMIN — Medication 100 MILLIEQUIVALENT(S): at 22:19

## 2022-09-14 RX ADMIN — Medication 100 MILLIEQUIVALENT(S): at 16:05

## 2022-09-14 RX ADMIN — HYDROMORPHONE HYDROCHLORIDE 0.5 MILLIGRAM(S): 2 INJECTION INTRAMUSCULAR; INTRAVENOUS; SUBCUTANEOUS at 16:15

## 2022-09-14 RX ADMIN — Medication 100 MILLIEQUIVALENT(S): at 19:49

## 2022-09-14 RX ADMIN — HYDROMORPHONE HYDROCHLORIDE 0.5 MILLIGRAM(S): 2 INJECTION INTRAMUSCULAR; INTRAVENOUS; SUBCUTANEOUS at 16:00

## 2022-09-14 RX ADMIN — Medication 100 GRAM(S): at 21:53

## 2022-09-14 RX ADMIN — Medication 30 MILLIGRAM(S): at 22:09

## 2022-09-14 RX ADMIN — Medication 125 MILLILITER(S): at 21:30

## 2022-09-14 RX ADMIN — Medication 125 MILLILITER(S): at 23:44

## 2022-09-14 RX ADMIN — Medication 125 MILLILITER(S): at 14:06

## 2022-09-14 RX ADMIN — SODIUM CHLORIDE 1000 MILLILITER(S): 9 INJECTION, SOLUTION INTRAVENOUS at 18:00

## 2022-09-14 RX ADMIN — SODIUM CHLORIDE 150 MILLILITER(S): 9 INJECTION, SOLUTION INTRAVENOUS at 19:48

## 2022-09-14 RX ADMIN — HYDROMORPHONE HYDROCHLORIDE 0.5 MILLIGRAM(S): 2 INJECTION INTRAMUSCULAR; INTRAVENOUS; SUBCUTANEOUS at 19:47

## 2022-09-14 RX ADMIN — SODIUM CHLORIDE 10 MILLILITER(S): 9 INJECTION INTRAMUSCULAR; INTRAVENOUS; SUBCUTANEOUS at 19:48

## 2022-09-14 RX ADMIN — Medication 125 MILLILITER(S): at 14:48

## 2022-09-14 RX ADMIN — ONDANSETRON 4 MILLIGRAM(S): 8 TABLET, FILM COATED ORAL at 21:53

## 2022-09-14 RX ADMIN — HYDROMORPHONE HYDROCHLORIDE 0.5 MILLIGRAM(S): 2 INJECTION INTRAMUSCULAR; INTRAVENOUS; SUBCUTANEOUS at 20:00

## 2022-09-14 RX ADMIN — Medication 30 MILLIGRAM(S): at 21:53

## 2022-09-14 RX ADMIN — NICARDIPINE HYDROCHLORIDE 25 MG/HR: 30 CAPSULE, EXTENDED RELEASE ORAL at 19:48

## 2022-09-14 RX ADMIN — Medication 100 MILLIEQUIVALENT(S): at 22:09

## 2022-09-14 RX ADMIN — Medication 100 MILLIEQUIVALENT(S): at 15:00

## 2022-09-14 RX ADMIN — ONDANSETRON 4 MILLIGRAM(S): 8 TABLET, FILM COATED ORAL at 17:12

## 2022-09-14 RX ADMIN — Medication 100 MILLIGRAM(S): at 18:05

## 2022-09-14 RX ADMIN — Medication 100 MILLIEQUIVALENT(S): at 19:00

## 2022-09-14 RX ADMIN — Medication 100 MILLIEQUIVALENT(S): at 18:00

## 2022-09-14 RX ADMIN — Medication 100 MILLIEQUIVALENT(S): at 16:30

## 2022-09-14 RX ADMIN — SODIUM CHLORIDE 2000 MILLILITER(S): 9 INJECTION, SOLUTION INTRAVENOUS at 18:00

## 2022-09-14 RX ADMIN — SODIUM CHLORIDE 2000 MILLILITER(S): 9 INJECTION, SOLUTION INTRAVENOUS at 18:30

## 2022-09-14 RX ADMIN — Medication 100 GRAM(S): at 17:12

## 2022-09-14 RX ADMIN — Medication 400 MILLIGRAM(S): at 18:00

## 2022-09-14 NOTE — PATIENT PROFILE ADULT - FALL HARM RISK - UNIVERSAL INTERVENTIONS
Bed in lowest position, wheels locked, appropriate side rails in place/Call bell, personal items and telephone in reach/Instruct patient to call for assistance before getting out of bed or chair/Non-slip footwear when patient is out of bed/[place] to call system/Physically safe environment - no spills, clutter or unnecessary equipment/Purposeful Proactive Rounding/Room/bathroom lighting operational, light cord in reach

## 2022-09-14 NOTE — PROGRESS NOTE ADULT - SUBJECTIVE AND OBJECTIVE BOX
CRITICAL CARE ATTENDING - CTICU    MEDICATIONS  (STANDING):  acetaminophen     Tablet .. 650 milliGRAM(s) Oral every 6 hours  aMIOdarone    Tablet 400 milliGRAM(s) Oral two times a day  ascorbic acid 500 milliGRAM(s) Oral two times a day  aspirin enteric coated 325 milliGRAM(s) Oral daily  aspirin Suppository 300 milliGRAM(s) Rectal once  cefuroxime  IVPB 1500 milliGRAM(s) IV Intermittent once  cefuroxime  IVPB 1500 milliGRAM(s) IV Intermittent every 8 hours  chlorhexidine 0.12% Liquid 15 milliLiter(s) Oral Mucosa every 12 hours  chlorhexidine 2% Cloths 1 Application(s) Topical daily  dexMEDEtomidine Infusion 0.4 MICROgram(s)/kG/Hr (6.31 mL/Hr) IV Continuous <Continuous>  dextrose 50% Injectable 50 milliLiter(s) IV Push every 15 minutes  dextrose 50% Injectable 25 milliLiter(s) IV Push every 15 minutes  gabapentin 100 milliGRAM(s) Oral every 8 hours  insulin regular Infusion 3 Unit(s)/Hr (3 mL/Hr) IV Continuous <Continuous>  meperidine     Injectable 25 milliGRAM(s) IV Push once  pantoprazole  Injectable 40 milliGRAM(s) IV Push daily  potassium chloride  10 mEq/50 mL IVPB 10 milliEquivalent(s) IV Intermittent every 1 hour  potassium chloride  10 mEq/50 mL IVPB 10 milliEquivalent(s) IV Intermittent every 1 hour  potassium chloride  10 mEq/50 mL IVPB 10 milliEquivalent(s) IV Intermittent every 1 hour  sodium chloride 0.9%. 1000 milliLiter(s) (10 mL/Hr) IV Continuous <Continuous>                              Mode: AC/ CMV (Assist Control/ Continuous Mandatory Ventilation)  RR (machine): 10  TV (machine): 600  FiO2: 60  PEEP: 5  ITime: 1  MAP: 9  PIP: 21      Daily Height in cm: 167.64 (14 Sep 2022 06:52)    Daily         Critically Ill patient  : [ ] preoperative ,   [ x] post operative    Requires :  [x ] Arterial Line   [x ] Central Line  [ ] PA catheter  [ ] IABP  [ ] ECMO  [ ] LVAD  [x ] Ventilator  [x ] pacemaker - TPM  [ ] Impella.                      [x ] ABG's     [ x] Pulse Oxymetry Monitoring  Bedside evaluation , monitoring , treatment of hemodynamics , fluids , IVP/ IVCD meds.        Diagnosis:     Op Day - AVR / Ascending Aortic Repair     Ascending Aortic Dilation  /  Aneurysm     Hypotension     Hypovolemia     Hemodynamic lability,  instability. Requires IVCD [x ] vasopressors - on/off  [ ] inotropes  [ ] vasodilator  [x ]IVSS fluid  to maintain MAP, perfusion, C.I.     Temporary pacemaker (TPM) interrogation and setting.     Requires  [ x]DDD  [ ]VVI    [ ]AII  temporary pacing at  80 min    to maintain HR, MAP, CI, and perfusion.     Chest Tube Drainage / Management     Ventilator Management:  [ x]AC-rest post op    [ x]CPAP-PS Wean  this PM    [ ]Trach Collar     [ ]Extubate    [ ] T-Piece  [ ]peep>5     Requires chest PT, pulmonary toilet, ambu bagging, suctioning to maintain SaO2,  patent airway and treat atelectasis.     IVCD  Insulin     Requires bedside physical therapy, mobilization and total skilled nursing care.                         Discussed with CT surgeon, Physician's Assistant - Nurse Practitioner- Critical care medicine team.   Discussed at  AM / PM rounds.   Chart, labs , films reviewed.    Cumulative Critical Care Time Given Today : 30 min

## 2022-09-14 NOTE — BRIEF OPERATIVE NOTE - BRIEF OP NOTE DRAINS
R chest tube  Left pleural chest tube  Diaphragmatic mediastinal chest tube  Substernal mediastinal chest tube

## 2022-09-14 NOTE — AIRWAY REMOVAL NOTE  ADULT & PEDS - ARTIFICAL AIRWAY REMOVAL COMMENTS
Written order for extubation verified. The patient was identified by full name and birth date compared to the identification band. Present during the procedure was Joseluis Mauro RRT and Lynsey Van RN

## 2022-09-14 NOTE — PATIENT PROFILE ADULT - HISTORY OF COVID-19 VACCINATION
IN LAB SLEEP STUDY    Please contact [facility] and speak with a physician reviewer at [phone].      If a [id number] is not done, a determination will be made based upon the clinical information previously provided which is sufficient to make a determinatio Yes

## 2022-09-14 NOTE — PRE-ANESTHESIA EVALUATION ADULT - NSANTHPEFT_GEN_ALL_CORE
GENERAL: NAD  HEAD:  Atraumatic, Normocephalic  CHEST/LUNG: Clear to auscultation bilaterally  HEART: Normal S1/S2; +sys murmur  PSYCH: AAOx3  NEUROLOGY: non-focal  SKIN: No obvious rashes or lesions

## 2022-09-14 NOTE — PRE-ANESTHESIA EVALUATION ADULT - NSANTHOSAYNRD_GEN_A_CORE
No. GRISELDA screening performed.  STOP BANG Legend: 0-2 = LOW Risk; 3-4 = INTERMEDIATE Risk; 5-8 = HIGH Risk

## 2022-09-14 NOTE — PATIENT PROFILE ADULT - NSPROMEDSADMININFO_GEN_A_NUR
no concerns

## 2022-09-14 NOTE — BRIEF OPERATIVE NOTE - OPERATION/FINDINGS
Aortic XClamp: 139    |    Total CPB: 166  Procedure:  Reoperative sternotomy  Excision prior aortic valve  Aortic valve replacement - Inspiris 25mm  Ascending aorta replacement with 28mm Hemashield graft.  LAAL with AtriClip ____mm    _____ greater saphenous vein harvested endoscopically by ________ PA

## 2022-09-14 NOTE — PRE-ANESTHESIA EVALUATION ADULT - NSANTHPMHFT_GEN_ALL_CORE
45 yo female PMH asthma - well controlled - no meds,  Hashimoto's controlled with Synthroid, hx of gastric ulcers in the past, denies GERD symptoms. bicuspid aortic valve - s/p AVR 2008, pt. reports symptoms of SOB, tiredness, dizziness for about a year, last echocardiogram revealed severe stenosis of the bioprosthetic aortic valve, pt. had CT Angio of the heart and coronaries 7/2022 ,  denies dysphagia.

## 2022-09-14 NOTE — PRE-ANESTHESIA EVALUATION ADULT - LAST ECHOCARDIOGRAM
6/23/22 - EF 50-55%. abnormally functioning porcine bioprosthetic aortic valve, severe stenosis of the bioprosthetic aortic valve; normal RV function, mild MR

## 2022-09-15 DIAGNOSIS — Z95.2 PRESENCE OF PROSTHETIC HEART VALVE: ICD-10-CM

## 2022-09-15 LAB
ALBUMIN SERPL ELPH-MCNC: 4.7 G/DL — SIGNIFICANT CHANGE UP (ref 3.3–5)
ALP SERPL-CCNC: 22 U/L — LOW (ref 40–120)
ALT FLD-CCNC: 13 U/L — SIGNIFICANT CHANGE UP (ref 10–45)
ANION GAP SERPL CALC-SCNC: 12 MMOL/L — SIGNIFICANT CHANGE UP (ref 5–17)
APTT BLD: 22.7 SEC — LOW (ref 27.5–35.5)
AST SERPL-CCNC: 41 U/L — HIGH (ref 10–40)
BILIRUB SERPL-MCNC: 0.9 MG/DL — SIGNIFICANT CHANGE UP (ref 0.2–1.2)
BUN SERPL-MCNC: 10 MG/DL — SIGNIFICANT CHANGE UP (ref 7–23)
CALCIUM SERPL-MCNC: 8.2 MG/DL — LOW (ref 8.4–10.5)
CHLORIDE SERPL-SCNC: 103 MMOL/L — SIGNIFICANT CHANGE UP (ref 96–108)
CO2 SERPL-SCNC: 20 MMOL/L — LOW (ref 22–31)
CREAT SERPL-MCNC: 0.57 MG/DL — SIGNIFICANT CHANGE UP (ref 0.5–1.3)
EGFR: 113 ML/MIN/1.73M2 — SIGNIFICANT CHANGE UP
FIBRINOGEN PPP-MCNC: 354 MG/DL — SIGNIFICANT CHANGE UP (ref 330–520)
GAS PNL BLDA: SIGNIFICANT CHANGE UP
GLUCOSE SERPL-MCNC: 122 MG/DL — HIGH (ref 70–99)
HCT VFR BLD CALC: 29.8 % — LOW (ref 34.5–45)
HGB BLD-MCNC: 10.2 G/DL — LOW (ref 11.5–15.5)
INR BLD: 1.24 RATIO — HIGH (ref 0.88–1.16)
MAGNESIUM SERPL-MCNC: 1.8 MG/DL — SIGNIFICANT CHANGE UP (ref 1.6–2.6)
MCHC RBC-ENTMCNC: 31.5 PG — SIGNIFICANT CHANGE UP (ref 27–34)
MCHC RBC-ENTMCNC: 34.2 GM/DL — SIGNIFICANT CHANGE UP (ref 32–36)
MCV RBC AUTO: 92 FL — SIGNIFICANT CHANGE UP (ref 80–100)
NRBC # BLD: 0 /100 WBCS — SIGNIFICANT CHANGE UP (ref 0–0)
PHOSPHATE SERPL-MCNC: 2.7 MG/DL — SIGNIFICANT CHANGE UP (ref 2.5–4.5)
PLATELET # BLD AUTO: 134 K/UL — LOW (ref 150–400)
POTASSIUM SERPL-MCNC: 4.2 MMOL/L — SIGNIFICANT CHANGE UP (ref 3.5–5.3)
POTASSIUM SERPL-SCNC: 4.2 MMOL/L — SIGNIFICANT CHANGE UP (ref 3.5–5.3)
PROT SERPL-MCNC: 6.4 G/DL — SIGNIFICANT CHANGE UP (ref 6–8.3)
PROTHROM AB SERPL-ACNC: 14.3 SEC — HIGH (ref 10.5–13.4)
RBC # BLD: 3.24 M/UL — LOW (ref 3.8–5.2)
RBC # FLD: 13.1 % — SIGNIFICANT CHANGE UP (ref 10.3–14.5)
SODIUM SERPL-SCNC: 135 MMOL/L — SIGNIFICANT CHANGE UP (ref 135–145)
WBC # BLD: 11.34 K/UL — HIGH (ref 3.8–10.5)
WBC # FLD AUTO: 11.34 K/UL — HIGH (ref 3.8–10.5)

## 2022-09-15 PROCEDURE — 93010 ELECTROCARDIOGRAM REPORT: CPT

## 2022-09-15 PROCEDURE — 71045 X-RAY EXAM CHEST 1 VIEW: CPT | Mod: 26

## 2022-09-15 PROCEDURE — 99291 CRITICAL CARE FIRST HOUR: CPT

## 2022-09-15 RX ORDER — MAGNESIUM SULFATE 500 MG/ML
2 VIAL (ML) INJECTION ONCE
Refills: 0 | Status: COMPLETED | OUTPATIENT
Start: 2022-09-15 | End: 2022-09-15

## 2022-09-15 RX ORDER — ENOXAPARIN SODIUM 100 MG/ML
40 INJECTION SUBCUTANEOUS EVERY 24 HOURS
Refills: 0 | Status: DISCONTINUED | OUTPATIENT
Start: 2022-09-15 | End: 2022-09-20

## 2022-09-15 RX ORDER — ATORVASTATIN CALCIUM 80 MG/1
40 TABLET, FILM COATED ORAL AT BEDTIME
Refills: 0 | Status: DISCONTINUED | OUTPATIENT
Start: 2022-09-15 | End: 2022-09-20

## 2022-09-15 RX ORDER — HYDRALAZINE HCL 50 MG
10 TABLET ORAL EVERY 8 HOURS
Refills: 0 | Status: DISCONTINUED | OUTPATIENT
Start: 2022-09-15 | End: 2022-09-16

## 2022-09-15 RX ORDER — METOCLOPRAMIDE HCL 10 MG
10 TABLET ORAL ONCE
Refills: 0 | Status: COMPLETED | OUTPATIENT
Start: 2022-09-15 | End: 2022-09-15

## 2022-09-15 RX ORDER — METOPROLOL TARTRATE 50 MG
12.5 TABLET ORAL EVERY 12 HOURS
Refills: 0 | Status: DISCONTINUED | OUTPATIENT
Start: 2022-09-15 | End: 2022-09-20

## 2022-09-15 RX ORDER — ACETAMINOPHEN 500 MG
1000 TABLET ORAL ONCE
Refills: 0 | Status: COMPLETED | OUTPATIENT
Start: 2022-09-15 | End: 2022-09-15

## 2022-09-15 RX ORDER — SODIUM CHLORIDE 9 MG/ML
500 INJECTION, SOLUTION INTRAVENOUS ONCE
Refills: 0 | Status: COMPLETED | OUTPATIENT
Start: 2022-09-15 | End: 2022-09-15

## 2022-09-15 RX ORDER — ASPIRIN/CALCIUM CARB/MAGNESIUM 324 MG
81 TABLET ORAL DAILY
Refills: 0 | Status: DISCONTINUED | OUTPATIENT
Start: 2022-09-15 | End: 2022-09-17

## 2022-09-15 RX ADMIN — GABAPENTIN 100 MILLIGRAM(S): 400 CAPSULE ORAL at 13:08

## 2022-09-15 RX ADMIN — Medication 88 MICROGRAM(S): at 06:33

## 2022-09-15 RX ADMIN — HYDROMORPHONE HYDROCHLORIDE 0.5 MILLIGRAM(S): 2 INJECTION INTRAMUSCULAR; INTRAVENOUS; SUBCUTANEOUS at 10:05

## 2022-09-15 RX ADMIN — Medication 650 MILLIGRAM(S): at 11:37

## 2022-09-15 RX ADMIN — GABAPENTIN 100 MILLIGRAM(S): 400 CAPSULE ORAL at 06:20

## 2022-09-15 RX ADMIN — Medication 400 MILLIGRAM(S): at 00:35

## 2022-09-15 RX ADMIN — Medication 1000 MILLIGRAM(S): at 01:00

## 2022-09-15 RX ADMIN — OXYCODONE HYDROCHLORIDE 10 MILLIGRAM(S): 5 TABLET ORAL at 20:44

## 2022-09-15 RX ADMIN — ATORVASTATIN CALCIUM 40 MILLIGRAM(S): 80 TABLET, FILM COATED ORAL at 06:33

## 2022-09-15 RX ADMIN — Medication 10 MILLIGRAM(S): at 21:11

## 2022-09-15 RX ADMIN — OXYCODONE HYDROCHLORIDE 10 MILLIGRAM(S): 5 TABLET ORAL at 21:14

## 2022-09-15 RX ADMIN — Medication 10 MILLIGRAM(S): at 08:27

## 2022-09-15 RX ADMIN — Medication 12.5 MILLIGRAM(S): at 17:11

## 2022-09-15 RX ADMIN — Medication 500 MILLIGRAM(S): at 17:12

## 2022-09-15 RX ADMIN — POLYETHYLENE GLYCOL 3350 17 GRAM(S): 17 POWDER, FOR SOLUTION ORAL at 11:37

## 2022-09-15 RX ADMIN — Medication 10 MILLIGRAM(S): at 13:08

## 2022-09-15 RX ADMIN — NICARDIPINE HYDROCHLORIDE 25 MG/HR: 30 CAPSULE, EXTENDED RELEASE ORAL at 04:55

## 2022-09-15 RX ADMIN — Medication 100 MILLIGRAM(S): at 15:46

## 2022-09-15 RX ADMIN — ENOXAPARIN SODIUM 40 MILLIGRAM(S): 100 INJECTION SUBCUTANEOUS at 15:46

## 2022-09-15 RX ADMIN — SENNA PLUS 2 TABLET(S): 8.6 TABLET ORAL at 21:11

## 2022-09-15 RX ADMIN — Medication 650 MILLIGRAM(S): at 17:12

## 2022-09-15 RX ADMIN — CHLORHEXIDINE GLUCONATE 1 APPLICATION(S): 213 SOLUTION TOPICAL at 07:07

## 2022-09-15 RX ADMIN — HYDROMORPHONE HYDROCHLORIDE 0.5 MILLIGRAM(S): 2 INJECTION INTRAMUSCULAR; INTRAVENOUS; SUBCUTANEOUS at 17:45

## 2022-09-15 RX ADMIN — Medication 10 MILLIGRAM(S): at 01:30

## 2022-09-15 RX ADMIN — Medication 100 MILLIGRAM(S): at 23:59

## 2022-09-15 RX ADMIN — AMIODARONE HYDROCHLORIDE 400 MILLIGRAM(S): 400 TABLET ORAL at 17:12

## 2022-09-15 RX ADMIN — HYDROMORPHONE HYDROCHLORIDE 0.5 MILLIGRAM(S): 2 INJECTION INTRAMUSCULAR; INTRAVENOUS; SUBCUTANEOUS at 06:00

## 2022-09-15 RX ADMIN — Medication 12.5 MILLIGRAM(S): at 06:20

## 2022-09-15 RX ADMIN — Medication 650 MILLIGRAM(S): at 23:59

## 2022-09-15 RX ADMIN — Medication 81 MILLIGRAM(S): at 06:33

## 2022-09-15 RX ADMIN — HYDROMORPHONE HYDROCHLORIDE 0.5 MILLIGRAM(S): 2 INJECTION INTRAMUSCULAR; INTRAVENOUS; SUBCUTANEOUS at 09:52

## 2022-09-15 RX ADMIN — SODIUM CHLORIDE 2000 MILLILITER(S): 9 INJECTION, SOLUTION INTRAVENOUS at 02:02

## 2022-09-15 RX ADMIN — HYDROMORPHONE HYDROCHLORIDE 0.5 MILLIGRAM(S): 2 INJECTION INTRAMUSCULAR; INTRAVENOUS; SUBCUTANEOUS at 06:15

## 2022-09-15 RX ADMIN — Medication 500 MILLIGRAM(S): at 06:20

## 2022-09-15 RX ADMIN — Medication 10 MILLIGRAM(S): at 15:45

## 2022-09-15 RX ADMIN — PANTOPRAZOLE SODIUM 40 MILLIGRAM(S): 20 TABLET, DELAYED RELEASE ORAL at 06:20

## 2022-09-15 RX ADMIN — Medication 100 MILLIGRAM(S): at 00:35

## 2022-09-15 RX ADMIN — Medication 100 MILLIGRAM(S): at 08:27

## 2022-09-15 RX ADMIN — GABAPENTIN 100 MILLIGRAM(S): 400 CAPSULE ORAL at 21:11

## 2022-09-15 RX ADMIN — Medication 10 MILLIGRAM(S): at 06:00

## 2022-09-15 RX ADMIN — HYDROMORPHONE HYDROCHLORIDE 0.5 MILLIGRAM(S): 2 INJECTION INTRAMUSCULAR; INTRAVENOUS; SUBCUTANEOUS at 17:11

## 2022-09-15 RX ADMIN — Medication 25 GRAM(S): at 01:52

## 2022-09-15 NOTE — PROGRESS NOTE ADULT - ASSESSMENT
Patient is a 47 yo female PMH asthma - well controlled - no meds,  Hashimoto's controlled with Synthroid, bicuspid aortic valve - s/p AVR 2008, Who had reports  of SOB, tiredness, dizziness for about a year, last echocardiogram revealed severe stenosis of the bioprosthetic aortic valve.  CT Angio of the heart and coronaries 7/2022 within normal. Patient is now post op day 1    Patient is a 47 yo female PMH asthma - well controlled - no meds,  Hashimoto's controlled with Synthroid, bicuspid aortic valve - s/p AVR 2008, Who had reports  of SOB, tiredness, dizziness for about a year, last echocardiogram revealed severe stenosis of the bioprosthetic aortic valve.  CT Angio of the heart and coronaries 7/2022 within normal. Patient is now post op day 1 from Aortic valve replacement/Ascending Aorta Replacement with Dr. Stevenson      9/14 AVR / Ascending Aortic Replacement, 5u cryo, 2 PLT, 500 FEIBA, AV Wires, R pleural chest tube,Left pleural chest tube  Diaphragmatic mediastinal chest tube, Substernal mediastinal chest tube.  Patient extubated in evening.      9/15 Lopressor 12.5mg Q12 started in AM, Cardene gtt titrated off, Hydralazine 10mg Q8 added for afterload reduction, Amio ERP for afib prophylaxis.  ASA 81mg QD and Lovenox 40mg QD started.  PO Synthroid resumed for hx of Hashimoto's.  TX to SDU in evening.  Chest tubes/RIJ intro/bailey remain in place

## 2022-09-15 NOTE — PROGRESS NOTE ADULT - SUBJECTIVE AND OBJECTIVE BOX
Patient seen and examined at the bedside.    Remained critically ill on continuous ICU monitoring.    OBJECTIVE:  Vital Signs Last 24 Hrs  T(C): 37.4 (15 Sep 2022 04:00), Max: 37.4 (15 Sep 2022 04:00)  T(F): 99.3 (15 Sep 2022 04:00), Max: 99.3 (15 Sep 2022 04:00)  HR: 67 (15 Sep 2022 06:45) (67 - 92)  BP: 91/58 (15 Sep 2022 06:45) (90/55 - 124/69)  BP(mean): 70 (15 Sep 2022 06:45) (67 - 102)  RR: 14 (15 Sep 2022 06:45) (7 - 31)  SpO2: 97% (15 Sep 2022 06:45) (96% - 100%)    Parameters below as of 15 Sep 2022 04:00  Patient On (Oxygen Delivery Method): room air                     Assessment:  45 yo female PMH asthma - well controlled - no meds,  Hashimoto's controlled with Synthroid, bicuspid aortic valve - s/p AVR 2008, pt. reports symptoms of SOB, tiredness, dizziness for about a year.    Prosthetic aortic valve stenosis s/p Replacement, aortic valve  Cardiogenic Shock  Hypovolemic Shock   Acute Blood Loss Anemia  Stress Hyperglycemia     Plan:   ***Neuro***  [x] Nonfocal    Post operative neuro assessment     ***Cardiovascular***  Invasive hemodynamic monitoring, assess perfusion indices   SR / CVP 3/ MAP 74/ Hct 29.8/ Lactate 0.9  [x] Cardene- 5 mG/hr  Volume: [x]  Albumin 1.5L given yesterday  Reassessment of hemodynamics post resuscitation   Continue with Amiodarone for atrial fibrillation prophylaxis.  Lopressor for Rate control   Monitor chest tube outputs   [x]  ASA  [x] Statin   Back up VVI paced at 50 via temp epicardial wires   Serial EKG and cardiac enzymes     ***Pulmonary***  Comfortable on room air, SpO2 97%  Continue to monitor SpO2 via pulse oximetry  Encourage bedside spirometry     Mode: CPAP with PS  FiO2: 40  PEEP: 5  PS: 5  MAP: 6  PIP: 12              ***GI***  [x]  Diet: Clear liquid   [x] Protonix    Bowel regimen with Miralax and senna  Reglan for gut motility     ***Renal***  Continue to monitor I/Os, BUN/Creatinine.   Replete lytes PRN  Hampton present [x] positive       ***ID***  Continue Cefuroxime for perioperative antibiotic coverage,     ***Endocrine***  [x] Stress Hyperglycemia: HbA1c 5.1%                - [x] Insulin gtt             - Need tight glycemic control to prevent wound infection.    [x] Hypothyroid   - Continue Synthroid         Patient requires continuous monitoring with bedside rhythm monitoring, pulse oximetry monitoring, and continuous central venous and arterial pressure monitoring; and intermittent blood gas analysis. Care plan discussed with the ICU care team.   Patient remained critical, at risk for life threatening decompensation.    I have spent 30 minutes providing critical care management to this patient.    By signing my name below, I, Denny Polo, attest that this documentation has been prepared under the direction and in the presence of Jeronimo Espino MD   Electronically signed: Arron Santos, 09-15-22 @ 07:29    I, Jeronimo Espino, personally performed the services described in this documentation. all medical record entries made by the scribe were at my direction and in my presence. I have reviewed the chart and agree that the record reflects my personal performance and is accurate and complete  Electronically signed: Jeronimo Espino MD  Patient seen and examined at the bedside.    Remained critically ill on continuous ICU monitoring.    OBJECTIVE:  Vital Signs Last 24 Hrs  T(C): 37.4 (15 Sep 2022 04:00), Max: 37.4 (15 Sep 2022 04:00)  T(F): 99.3 (15 Sep 2022 04:00), Max: 99.3 (15 Sep 2022 04:00)  HR: 67 (15 Sep 2022 06:45) (67 - 92)  BP: 91/58 (15 Sep 2022 06:45) (90/55 - 124/69)  BP(mean): 70 (15 Sep 2022 06:45) (67 - 102)  RR: 14 (15 Sep 2022 06:45) (7 - 31)  SpO2: 97% (15 Sep 2022 06:45) (96% - 100%)    Parameters below as of 15 Sep 2022 04:00  Patient On (Oxygen Delivery Method): room air                     Assessment:  45 yo female PMH asthma - well controlled - no meds,  Hashimoto's controlled with Synthroid, bicuspid aortic valve - s/p AVR 2008, pt. reports symptoms of SOB, tiredness, dizziness for about a year.    Prosthetic aortic valve stenosis s/p Replacement, aortic valve  Cardiogenic Shock  Hypovolemic Shock   Acute Blood Loss Anemia  Stress Hyperglycemia     Plan:   ***Neuro***  [x] Nonfocal    Post operative neuro assessment     ***Cardiovascular***  Invasive hemodynamic monitoring, assess perfusion indices   SR / CVP 3/ MAP 74/ Hct 29.8/ Lactate 0.9  [x] Cardene- 5 mG/hr  Volume: [x]  Albumin 1.5L given yesterday  Reassessment of hemodynamics post resuscitation   Continue with Amiodarone for atrial fibrillation prophylaxis.  PO Hydralazine for afterload reduction   Lopressor for Rate control   Monitor chest tube outputs  Plan to start VTE prophylaxis with Lovenox   [x]  ASA  [x] Statin   Back up VVI paced at 50 via temp epicardial wires   Serial EKG and cardiac enzymes     ***Pulmonary***  [x] 2L NC  Encourage incentive spirometry, continue pulse ox monitoring, follow ABGs     Mode: CPAP with PS  FiO2: 40  PEEP: 5  PS: 5  MAP: 6  PIP: 12              ***GI***  [x]  Diet: Clear liquid   [x] Protonix    Bowel regimen with Miralax and senna  Reglan for gut motility     ***Renal***  Continue to monitor I/Os, BUN/Creatinine.   Replete lytes PRN  Hampton present [x] positive       ***ID***  Continue Cefuroxime for perioperative antibiotic coverage,     ***Endocrine***  [x] Stress Hyperglycemia: HbA1c 5.1%                - [x] Insulin gtt             - Need tight glycemic control to prevent wound infection.    [x] Hypothyroid   - Continue Synthroid         Patient requires continuous monitoring with bedside rhythm monitoring, pulse oximetry monitoring, and continuous central venous and arterial pressure monitoring; and intermittent blood gas analysis. Care plan discussed with the ICU care team.   Patient remained critical, at risk for life threatening decompensation.    I have spent 35 minutes providing critical care management to this patient.    By signing my name below, I, Denny Polo, attest that this documentation has been prepared under the direction and in the presence of Jeronimo Espino MD   Electronically signed: Arron Santos, 09-15-22 @ 07:29    I, Jeronimo Espino, personally performed the services described in this documentation. all medical record entries made by the scribe were at my direction and in my presence. I have reviewed the chart and agree that the record reflects my personal performance and is accurate and complete  Electronically signed: Jeronimo Espino MD

## 2022-09-15 NOTE — OCCUPATIONAL THERAPY INITIAL EVALUATION ADULT - PERTINENT HX OF CURRENT PROBLEM, REHAB EVAL
47 yo female PMH asthma - well controlled - no meds,  Hashimoto's controlled with Synthroid, bicuspid aortic valve - s/p AVR 2008, pt. reports symptoms of SOB, tiredness, dizziness for about a year, last echocardiogram revealed severe stenosis of the bioprosthetic aortic valve, pt. had CT Angio of the heart and coronaries 7/2022 - WNL. Pt. denies COVID-19 infection in 2020 through 2022, denies close contact with anyone that has been ill, no fever, cough, dyspnea in past two weeks. Prosthetic aortic valve stenosis s/p Replacement, aortic valve

## 2022-09-15 NOTE — PHYSICAL THERAPY INITIAL EVALUATION ADULT - ADDITIONAL COMMENTS
pt lives in private home with family, 3 steps to enter without hand rail, first floor set up. Prior to admission, pt was I with all functional mobility and ADLs without AD. Pt works as a . +.

## 2022-09-15 NOTE — PROGRESS NOTE ADULT - REASON FOR ADMISSION
AVR Re-Op AVR, Aortic valve replacement - Inspiris 25mm  Ascending aorta replacement with 28mm Hemashield graft.

## 2022-09-15 NOTE — PHYSICAL THERAPY INITIAL EVALUATION ADULT - PERTINENT HX OF CURRENT PROBLEM, REHAB EVAL
45yo F h/o AVR 2008, pt. reports symptoms of SOB, tiredness, dizziness for about a year. 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 XRay Chest 9/12: Neg.

## 2022-09-15 NOTE — PROGRESS NOTE ADULT - PROBLEM SELECTOR PLAN 1
9/14 AVR / Ascending Aortic Replacement    9/15 TX to SDU    -Continue Lopressor 12.5mg BID and titrate as tolerated  -Continue Hydralazine 10mg Q8 for afterload reduction  -Continue ERP Amio for afib ppx  -Continue ASA 81mg/Statin  + PW VVI 50/10    Continue to monitor chest tube drainage  +Hampton will continue to monitor urine output and reassess when able to be dc'd.  Will leave in overnight  -Patient encouraged to get OOB to chair as tolerated, increase ambulation and perform coughing/deep breathing exercises with heart pillow brace.

## 2022-09-15 NOTE — PROGRESS NOTE ADULT - SUBJECTIVE AND OBJECTIVE BOX
Subjective: "I am ok "    TELEMETRY:    VITAL SIGNS    Vital Signs Last 24 Hrs  T(C): 36.8 (09-15-22 @ 18:15), Max: 37.5 (09-15-22 @ 12:00)  T(F): 98.2 (09-15-22 @ 18:15), Max: 99.5 (09-15-22 @ 12:00)  HR: 66 (09-15-22 @ 18:15) (62 - 92)  BP: 112/65 (09-15-22 @ 18:15) (90/55 - 129/73)  RR: 18 (09-15-22 @ 18:15) (7 - 33)  SpO2: 100% (09-15-22 @ 18:15) (94% - 100%)             @ 07:01  -  09-15 @ 07:00  --------------------------------------------------------  IN: 6867.5 mL / OUT: 8015 mL / NET: -1147.5 mL    09-15 @ 07:01  -  09-15 @ 18:52  --------------------------------------------------------  IN: 160 mL / OUT: 1265 mL / NET: -1105 mL       Daily     Daily Weight in k.2 (15 Sep 2022 00:00)  Admit Wt: Drug Dosing Weight  Height (cm): 167.6 (14 Sep 2022 06:52)  Weight (kg): 63.1 (14 Sep 2022 06:52)  BMI (kg/m2): 22.5 (14 Sep 2022 06:52)  BSA (m2): 1.71 (14 Sep 2022 06:52)    Bilirubin Total, Serum: 0.9 mg/dL (09-15 @ 00:30)    CAPILLARY BLOOD GLUCOSE  123 (15 Sep 2022 12:00)  122 (14 Sep 2022 19:00)      POCT Blood Glucose.: 98 mg/dL (15 Sep 2022 17:05)  POCT Blood Glucose.: 125 mg/dL (15 Sep 2022 12:06)  POCT Blood Glucose.: 146 mg/dL (15 Sep 2022 10:29)  POCT Blood Glucose.: 134 mg/dL (15 Sep 2022 06:22)  POCT Blood Glucose.: 126 mg/dL (15 Sep 2022 03:50)  POCT Blood Glucose.: 125 mg/dL (15 Sep 2022 01:56)  POCT Blood Glucose.: 121 mg/dL (15 Sep 2022 00:08)  POCT Blood Glucose.: 118 mg/dL (14 Sep 2022 22:02)  POCT Blood Glucose.: 125 mg/dL (14 Sep 2022 20:40)            PHYSICAL EXAM    Neurology: alert and oriented x 3, nonfocal, no gross deficits  CV : tele:  RSR  Sternal Wound :  CDI with dressing , Stable  Lungs: Clear/diminished. RR easy, unlabored   Abdomen: soft, nontender, nondistended, positive bowel sounds, bowel movement   Neg N/V/D   :  +bailey voiding clear yellow urine.   Extremities: Able to move all extremities; 1+ LE edema, neg calf tenderness.   PPP bilaterally    Lines- RIJ Double Lumen Intro dressing cdi sites without erythema     PW: AV Wires     Chest tubes:  Bulb--20cc/24hr                      MEDSx2-                       Left Pleural chest tube-                       Right Pleural chest tube -         acetaminophen     Tablet .. 650 milliGRAM(s) Oral every 6 hours  aMIOdarone    Tablet 400 milliGRAM(s) Oral two times a day  ascorbic acid 500 milliGRAM(s) Oral two times a day  aspirin enteric coated 81 milliGRAM(s) Oral daily  atorvastatin 40 milliGRAM(s) Oral at bedtime  cefuroxime  IVPB 1500 milliGRAM(s) IV Intermittent every 8 hours  chlorhexidine 2% Cloths 1 Application(s) Topical daily  enoxaparin Injectable 40 milliGRAM(s) SubCutaneous every 24 hours  gabapentin 100 milliGRAM(s) Oral every 8 hours  hydrALAZINE 10 milliGRAM(s) Oral every 8 hours  HYDROmorphone  Injectable 0.5 milliGRAM(s) IV Push every 6 hours PRN  levothyroxine 88 MICROGram(s) Oral daily  metoclopramide Injectable 10 milliGRAM(s) IV Push every 8 hours  metoprolol tartrate 12.5 milliGRAM(s) Oral every 12 hours  niCARdipine Infusion 5 mG/Hr IV Continuous <Continuous>  oxyCODONE    IR 5 milliGRAM(s) Oral every 4 hours PRN  oxyCODONE    IR 10 milliGRAM(s) Oral every 4 hours PRN  pantoprazole    Tablet 40 milliGRAM(s) Oral before breakfast  polyethylene glycol 3350 17 Gram(s) Oral daily  senna 2 Tablet(s) Oral at bedtime  sodium chloride 0.9%. 1000 milliLiter(s) IV Continuous <Continuous>                         Subjective: "I am ok "    TELEMETRY: NSR 60's     VITAL SIGNS    Vital Signs Last 24 Hrs  T(C): 36.8 (09-15-22 @ 18:15), Max: 37.5 (09-15-22 @ 12:00)  T(F): 98.2 (09-15-22 @ 18:15), Max: 99.5 (09-15-22 @ 12:00)  HR: 66 (09-15-22 @ 18:15) (62 - 92)  BP: 112/65 (09-15-22 @ 18:15) (90/55 - 129/73)  RR: 18 (09-15-22 @ 18:15) (7 - 33)  SpO2: 100% (09-15-22 @ 18:15) (94% - 100%)             @ 07:01  -  09-15 @ 07:00  --------------------------------------------------------  IN: 6867.5 mL / OUT: 8015 mL / NET: -1147.5 mL    09-15 @ 07:01  -  09-15 @ 18:52  --------------------------------------------------------  IN: 160 mL / OUT: 1265 mL / NET: -1105 mL       Daily     Daily Weight in k.2 (15 Sep 2022 00:00)  Admit Wt: Drug Dosing Weight  Height (cm): 167.6 (14 Sep 2022 06:52)  Weight (kg): 63.1 (14 Sep 2022 06:52)  BMI (kg/m2): 22.5 (14 Sep 2022 06:52)  BSA (m2): 1.71 (14 Sep 2022 06:52)    Bilirubin Total, Serum: 0.9 mg/dL (09-15 @ 00:30)    CAPILLARY BLOOD GLUCOSE  123 (15 Sep 2022 12:00)  122 (14 Sep 2022 19:00)      POCT Blood Glucose.: 98 mg/dL (15 Sep 2022 17:05)  POCT Blood Glucose.: 125 mg/dL (15 Sep 2022 12:06)  POCT Blood Glucose.: 146 mg/dL (15 Sep 2022 10:29)  POCT Blood Glucose.: 134 mg/dL (15 Sep 2022 06:22)  POCT Blood Glucose.: 126 mg/dL (15 Sep 2022 03:50)  POCT Blood Glucose.: 125 mg/dL (15 Sep 2022 01:56)  POCT Blood Glucose.: 121 mg/dL (15 Sep 2022 00:08)  POCT Blood Glucose.: 118 mg/dL (14 Sep 2022 22:02)  POCT Blood Glucose.: 125 mg/dL (14 Sep 2022 20:40)            PHYSICAL EXAM    Neurology: alert and oriented x 4, nonfocal, no gross deficits  CV : RRR +S1/S2  Sternal Wound :  CDI with dressing , Stable  Lungs: Clear/diminished. RR easy, unlabored   Abdomen: soft, nontender, nondistended, positive bowel sounds, bowel movement   Neg N/V/D   :  +bailey voiding clear yellow urine.   Extremities: Able to move all extremities; 1+ LE edema, neg calf tenderness.   PPP bilaterally    Lines- RIJ Double Lumen Intro dressing cdi sites without erythema     PW: AV Wires     Chest tubes:  Bulb--20cc/24hr                      MEDSx2-                       Left Pleural chest tube-                       Right Pleural chest tube -         acetaminophen     Tablet .. 650 milliGRAM(s) Oral every 6 hours  aMIOdarone    Tablet 400 milliGRAM(s) Oral two times a day  ascorbic acid 500 milliGRAM(s) Oral two times a day  aspirin enteric coated 81 milliGRAM(s) Oral daily  atorvastatin 40 milliGRAM(s) Oral at bedtime  cefuroxime  IVPB 1500 milliGRAM(s) IV Intermittent every 8 hours  chlorhexidine 2% Cloths 1 Application(s) Topical daily  enoxaparin Injectable 40 milliGRAM(s) SubCutaneous every 24 hours  gabapentin 100 milliGRAM(s) Oral every 8 hours  hydrALAZINE 10 milliGRAM(s) Oral every 8 hours  HYDROmorphone  Injectable 0.5 milliGRAM(s) IV Push every 6 hours PRN  levothyroxine 88 MICROGram(s) Oral daily  metoclopramide Injectable 10 milliGRAM(s) IV Push every 8 hours  metoprolol tartrate 12.5 milliGRAM(s) Oral every 12 hours  niCARdipine Infusion 5 mG/Hr IV Continuous <Continuous>  oxyCODONE    IR 5 milliGRAM(s) Oral every 4 hours PRN  oxyCODONE    IR 10 milliGRAM(s) Oral every 4 hours PRN  pantoprazole    Tablet 40 milliGRAM(s) Oral before breakfast  polyethylene glycol 3350 17 Gram(s) Oral daily  senna 2 Tablet(s) Oral at bedtime  sodium chloride 0.9%. 1000 milliLiter(s) IV Continuous <Continuous>                         Subjective: "I am ok "  Patient denies chest pain/shortness of breath.      TELEMETRY: NSR 60's     VITAL SIGNS    Vital Signs Last 24 Hrs  T(C): 36.8 (09-15-22 @ 18:15), Max: 37.5 (09-15-22 @ 12:00)  T(F): 98.2 (09-15-22 @ 18:15), Max: 99.5 (09-15-22 @ 12:00)  HR: 66 (09-15-22 @ 18:15) (62 - 92)  BP: 112/65 (09-15-22 @ 18:15) (90/55 - 129/73)  RR: 18 (09-15-22 @ 18:15) (7 - 33)  SpO2: 100% (09-15-22 @ 18:15) (94% - 100%)             @ 07:01  -  09-15 @ 07:00  --------------------------------------------------------  IN: 6867.5 mL / OUT: 8015 mL / NET: -1147.5 mL    09-15 @ 07:01  -  09-15 @ 18:52  --------------------------------------------------------  IN: 160 mL / OUT: 1265 mL / NET: -1105 mL       Daily     Daily Weight in k.2 (15 Sep 2022 00:00)  Admit Wt: Drug Dosing Weight  Height (cm): 167.6 (14 Sep 2022 06:52)  Weight (kg): 63.1 (14 Sep 2022 06:52)  BMI (kg/m2): 22.5 (14 Sep 2022 06:52)  BSA (m2): 1.71 (14 Sep 2022 06:52)    Bilirubin Total, Serum: 0.9 mg/dL (09-15 @ 00:30)    CAPILLARY BLOOD GLUCOSE  123 (15 Sep 2022 12:00)  122 (14 Sep 2022 19:00)      POCT Blood Glucose.: 98 mg/dL (15 Sep 2022 17:05)  POCT Blood Glucose.: 125 mg/dL (15 Sep 2022 12:06)  POCT Blood Glucose.: 146 mg/dL (15 Sep 2022 10:29)  POCT Blood Glucose.: 134 mg/dL (15 Sep 2022 06:22)  POCT Blood Glucose.: 126 mg/dL (15 Sep 2022 03:50)  POCT Blood Glucose.: 125 mg/dL (15 Sep 2022 01:56)  POCT Blood Glucose.: 121 mg/dL (15 Sep 2022 00:08)  POCT Blood Glucose.: 118 mg/dL (14 Sep 2022 22:02)  POCT Blood Glucose.: 125 mg/dL (14 Sep 2022 20:40)            PHYSICAL EXAM    Neurology: alert and oriented x 4, nonfocal, no gross deficits  CV : RRR +S1/S2  Sternal Wound :  CDI with dressing , Stable  Lungs: Clear/diminished. RR easy, unlabored   Abdomen: soft, nontender, nondistended, positive bowel sounds, bowel movement   Neg N/V/D   :  +bailey voiding clear yellow urine.   Extremities: Able to move all extremities; 1+ LE edema, neg calf tenderness.   PPP bilaterally    Lines- RIJ Double Lumen Intro dressing cdi sites without erythema     PW: AV Wires     Chest tubes:  Bulb--20cc/24hr                      MEDSx2- small amount of serosanguinous drainage                       Left Pleural chest tube- small amount of serosanguinous drainage                       Right Pleural chest tube- small amount of serosanguinous drainage          acetaminophen     Tablet .. 650 milliGRAM(s) Oral every 6 hours  aMIOdarone    Tablet 400 milliGRAM(s) Oral two times a day  ascorbic acid 500 milliGRAM(s) Oral two times a day  aspirin enteric coated 81 milliGRAM(s) Oral daily  atorvastatin 40 milliGRAM(s) Oral at bedtime  cefuroxime  IVPB 1500 milliGRAM(s) IV Intermittent every 8 hours  chlorhexidine 2% Cloths 1 Application(s) Topical daily  enoxaparin Injectable 40 milliGRAM(s) SubCutaneous every 24 hours  gabapentin 100 milliGRAM(s) Oral every 8 hours  hydrALAZINE 10 milliGRAM(s) Oral every 8 hours  HYDROmorphone  Injectable 0.5 milliGRAM(s) IV Push every 6 hours PRN  levothyroxine 88 MICROGram(s) Oral daily  metoclopramide Injectable 10 milliGRAM(s) IV Push every 8 hours  metoprolol tartrate 12.5 milliGRAM(s) Oral every 12 hours  niCARdipine Infusion 5 mG/Hr IV Continuous <Continuous>  oxyCODONE    IR 5 milliGRAM(s) Oral every 4 hours PRN  oxyCODONE    IR 10 milliGRAM(s) Oral every 4 hours PRN  pantoprazole    Tablet 40 milliGRAM(s) Oral before breakfast  polyethylene glycol 3350 17 Gram(s) Oral daily  senna 2 Tablet(s) Oral at bedtime  sodium chloride 0.9%. 1000 milliLiter(s) IV Continuous <Continuous>

## 2022-09-15 NOTE — OCCUPATIONAL THERAPY INITIAL EVALUATION ADULT - GENERAL OBSERVATIONS, REHAB EVAL
Patient found seated in bedside chair RN present throughout, +tele +IVL +EPM +CTx3 +bailey +RIJ +rohith +2L O2 via NC

## 2022-09-16 LAB
ALBUMIN SERPL ELPH-MCNC: 4 G/DL — SIGNIFICANT CHANGE UP (ref 3.3–5)
ALP SERPL-CCNC: 25 U/L — LOW (ref 40–120)
ALT FLD-CCNC: 14 U/L — SIGNIFICANT CHANGE UP (ref 10–45)
ANION GAP SERPL CALC-SCNC: 9 MMOL/L — SIGNIFICANT CHANGE UP (ref 5–17)
AST SERPL-CCNC: 32 U/L — SIGNIFICANT CHANGE UP (ref 10–40)
BILIRUB SERPL-MCNC: 0.5 MG/DL — SIGNIFICANT CHANGE UP (ref 0.2–1.2)
BUN SERPL-MCNC: 13 MG/DL — SIGNIFICANT CHANGE UP (ref 7–23)
CALCIUM SERPL-MCNC: 8.1 MG/DL — LOW (ref 8.4–10.5)
CHLORIDE SERPL-SCNC: 103 MMOL/L — SIGNIFICANT CHANGE UP (ref 96–108)
CO2 SERPL-SCNC: 24 MMOL/L — SIGNIFICANT CHANGE UP (ref 22–31)
CREAT SERPL-MCNC: 0.67 MG/DL — SIGNIFICANT CHANGE UP (ref 0.5–1.3)
EGFR: 109 ML/MIN/1.73M2 — SIGNIFICANT CHANGE UP
GLUCOSE SERPL-MCNC: 107 MG/DL — HIGH (ref 70–99)
HCT VFR BLD CALC: 32.5 % — LOW (ref 34.5–45)
HGB BLD-MCNC: 10.7 G/DL — LOW (ref 11.5–15.5)
MAGNESIUM SERPL-MCNC: 2.1 MG/DL — SIGNIFICANT CHANGE UP (ref 1.6–2.6)
MCHC RBC-ENTMCNC: 31.1 PG — SIGNIFICANT CHANGE UP (ref 27–34)
MCHC RBC-ENTMCNC: 32.9 GM/DL — SIGNIFICANT CHANGE UP (ref 32–36)
MCV RBC AUTO: 94.5 FL — SIGNIFICANT CHANGE UP (ref 80–100)
NRBC # BLD: 0 /100 WBCS — SIGNIFICANT CHANGE UP (ref 0–0)
PHOSPHATE SERPL-MCNC: 1.9 MG/DL — LOW (ref 2.5–4.5)
PLATELET # BLD AUTO: 138 K/UL — LOW (ref 150–400)
POTASSIUM SERPL-MCNC: 4.3 MMOL/L — SIGNIFICANT CHANGE UP (ref 3.5–5.3)
POTASSIUM SERPL-SCNC: 4.3 MMOL/L — SIGNIFICANT CHANGE UP (ref 3.5–5.3)
PROT SERPL-MCNC: 6 G/DL — SIGNIFICANT CHANGE UP (ref 6–8.3)
RBC # BLD: 3.44 M/UL — LOW (ref 3.8–5.2)
RBC # FLD: 13.3 % — SIGNIFICANT CHANGE UP (ref 10.3–14.5)
SODIUM SERPL-SCNC: 136 MMOL/L — SIGNIFICANT CHANGE UP (ref 135–145)
WBC # BLD: 11.24 K/UL — HIGH (ref 3.8–10.5)
WBC # FLD AUTO: 11.24 K/UL — HIGH (ref 3.8–10.5)

## 2022-09-16 PROCEDURE — 71045 X-RAY EXAM CHEST 1 VIEW: CPT | Mod: 26

## 2022-09-16 PROCEDURE — 71045 X-RAY EXAM CHEST 1 VIEW: CPT | Mod: 26,77

## 2022-09-16 RX ORDER — HYDROMORPHONE HYDROCHLORIDE 2 MG/ML
4 INJECTION INTRAMUSCULAR; INTRAVENOUS; SUBCUTANEOUS EVERY 4 HOURS
Refills: 0 | Status: DISCONTINUED | OUTPATIENT
Start: 2022-09-16 | End: 2022-09-20

## 2022-09-16 RX ORDER — SPIRONOLACTONE 25 MG/1
25 TABLET, FILM COATED ORAL DAILY
Refills: 0 | Status: DISCONTINUED | OUTPATIENT
Start: 2022-09-16 | End: 2022-09-17

## 2022-09-16 RX ORDER — FUROSEMIDE 40 MG
20 TABLET ORAL DAILY
Refills: 0 | Status: DISCONTINUED | OUTPATIENT
Start: 2022-09-16 | End: 2022-09-20

## 2022-09-16 RX ORDER — ACETAMINOPHEN 500 MG
1000 TABLET ORAL ONCE
Refills: 0 | Status: COMPLETED | OUTPATIENT
Start: 2022-09-16 | End: 2022-09-16

## 2022-09-16 RX ORDER — SODIUM,POTASSIUM PHOSPHATES 278-250MG
1 POWDER IN PACKET (EA) ORAL
Refills: 0 | Status: COMPLETED | OUTPATIENT
Start: 2022-09-16 | End: 2022-09-17

## 2022-09-16 RX ORDER — HYDROMORPHONE HYDROCHLORIDE 2 MG/ML
2 INJECTION INTRAMUSCULAR; INTRAVENOUS; SUBCUTANEOUS
Refills: 0 | Status: DISCONTINUED | OUTPATIENT
Start: 2022-09-16 | End: 2022-09-20

## 2022-09-16 RX ADMIN — Medication 10 MILLIGRAM(S): at 15:30

## 2022-09-16 RX ADMIN — AMIODARONE HYDROCHLORIDE 400 MILLIGRAM(S): 400 TABLET ORAL at 05:03

## 2022-09-16 RX ADMIN — GABAPENTIN 100 MILLIGRAM(S): 400 CAPSULE ORAL at 05:04

## 2022-09-16 RX ADMIN — AMIODARONE HYDROCHLORIDE 400 MILLIGRAM(S): 400 TABLET ORAL at 17:52

## 2022-09-16 RX ADMIN — ENOXAPARIN SODIUM 40 MILLIGRAM(S): 100 INJECTION SUBCUTANEOUS at 15:30

## 2022-09-16 RX ADMIN — Medication 650 MILLIGRAM(S): at 05:03

## 2022-09-16 RX ADMIN — OXYCODONE HYDROCHLORIDE 10 MILLIGRAM(S): 5 TABLET ORAL at 01:26

## 2022-09-16 RX ADMIN — PANTOPRAZOLE SODIUM 40 MILLIGRAM(S): 20 TABLET, DELAYED RELEASE ORAL at 05:04

## 2022-09-16 RX ADMIN — HYDROMORPHONE HYDROCHLORIDE 2 MILLIGRAM(S): 2 INJECTION INTRAMUSCULAR; INTRAVENOUS; SUBCUTANEOUS at 23:01

## 2022-09-16 RX ADMIN — HYDROMORPHONE HYDROCHLORIDE 4 MILLIGRAM(S): 2 INJECTION INTRAMUSCULAR; INTRAVENOUS; SUBCUTANEOUS at 19:29

## 2022-09-16 RX ADMIN — Medication 20 MILLIGRAM(S): at 11:50

## 2022-09-16 RX ADMIN — Medication 500 MILLIGRAM(S): at 17:52

## 2022-09-16 RX ADMIN — Medication 650 MILLIGRAM(S): at 17:52

## 2022-09-16 RX ADMIN — GABAPENTIN 100 MILLIGRAM(S): 400 CAPSULE ORAL at 21:09

## 2022-09-16 RX ADMIN — Medication 1000 MILLIGRAM(S): at 12:00

## 2022-09-16 RX ADMIN — GABAPENTIN 100 MILLIGRAM(S): 400 CAPSULE ORAL at 15:30

## 2022-09-16 RX ADMIN — Medication 88 MICROGRAM(S): at 05:04

## 2022-09-16 RX ADMIN — ATORVASTATIN CALCIUM 40 MILLIGRAM(S): 80 TABLET, FILM COATED ORAL at 21:09

## 2022-09-16 RX ADMIN — HYDROMORPHONE HYDROCHLORIDE 4 MILLIGRAM(S): 2 INJECTION INTRAMUSCULAR; INTRAVENOUS; SUBCUTANEOUS at 19:59

## 2022-09-16 RX ADMIN — Medication 12.5 MILLIGRAM(S): at 17:53

## 2022-09-16 RX ADMIN — OXYCODONE HYDROCHLORIDE 5 MILLIGRAM(S): 5 TABLET ORAL at 08:34

## 2022-09-16 RX ADMIN — Medication 81 MILLIGRAM(S): at 11:39

## 2022-09-16 RX ADMIN — CHLORHEXIDINE GLUCONATE 1 APPLICATION(S): 213 SOLUTION TOPICAL at 09:00

## 2022-09-16 RX ADMIN — OXYCODONE HYDROCHLORIDE 10 MILLIGRAM(S): 5 TABLET ORAL at 00:56

## 2022-09-16 RX ADMIN — POLYETHYLENE GLYCOL 3350 17 GRAM(S): 17 POWDER, FOR SOLUTION ORAL at 11:41

## 2022-09-16 RX ADMIN — SENNA PLUS 2 TABLET(S): 8.6 TABLET ORAL at 21:09

## 2022-09-16 RX ADMIN — OXYCODONE HYDROCHLORIDE 5 MILLIGRAM(S): 5 TABLET ORAL at 09:30

## 2022-09-16 RX ADMIN — Medication 10 MILLIGRAM(S): at 05:04

## 2022-09-16 RX ADMIN — Medication 400 MILLIGRAM(S): at 11:29

## 2022-09-16 RX ADMIN — SPIRONOLACTONE 25 MILLIGRAM(S): 25 TABLET, FILM COATED ORAL at 11:50

## 2022-09-16 RX ADMIN — HYDROMORPHONE HYDROCHLORIDE 4 MILLIGRAM(S): 2 INJECTION INTRAMUSCULAR; INTRAVENOUS; SUBCUTANEOUS at 10:05

## 2022-09-16 RX ADMIN — HYDROMORPHONE HYDROCHLORIDE 4 MILLIGRAM(S): 2 INJECTION INTRAMUSCULAR; INTRAVENOUS; SUBCUTANEOUS at 10:53

## 2022-09-16 RX ADMIN — HYDROMORPHONE HYDROCHLORIDE 2 MILLIGRAM(S): 2 INJECTION INTRAMUSCULAR; INTRAVENOUS; SUBCUTANEOUS at 22:31

## 2022-09-16 RX ADMIN — Medication 500 MILLIGRAM(S): at 05:03

## 2022-09-16 RX ADMIN — Medication 1 TABLET(S): at 17:53

## 2022-09-16 RX ADMIN — Medication 1 TABLET(S): at 21:09

## 2022-09-16 RX ADMIN — Medication 12.5 MILLIGRAM(S): at 08:34

## 2022-09-16 RX ADMIN — Medication 1 TABLET(S): at 11:50

## 2022-09-16 NOTE — PROGRESS NOTE ADULT - SUBJECTIVE AND OBJECTIVE BOX
VITAL SIGNS    Telemetry:  nsr 60    Vital Signs Last 24 Hrs  T(C): 36.8 (22 @ 07:04), Max: 37.5 (09-15-22 @ 12:00)  T(F): 98.2 (22 @ 07:04), Max: 99.5 (09-15-22 @ 12:00)  HR: 74 (22 @ 08:22) (62 - 78)  BP: 125/59 (22 @ 08:22) (90/60 - 129/73)  RR: 18 (22 @ 08:22) (13 - 33)  SpO2: 100% (22 @ 08:22) (97% - 100%)                   09-15 @ 07:01  -   @ 07:00  --------------------------------------------------------  IN: 440 mL / OUT: 2440 mL / NET: -2000 mL     @ 07:01  -   @ 10:35  --------------------------------------------------------  IN: 0 mL / OUT: 530 mL / NET: -530 mL          Daily     Daily Weight in k.5 (16 Sep 2022 07:40)            CAPILLARY BLOOD GLUCOSE  123 (15 Sep 2022 12:00)      POCT Blood Glucose.: 98 mg/dL (15 Sep 2022 17:05)  POCT Blood Glucose.: 125 mg/dL (15 Sep 2022 12:06)            Drains:     MS         [ x ] Drainage:                 L Pleural  [ x ]  Drainage:              bulb    Pacing Wires        [x  ]   Settings:            vvi                      Isolated  [  ]    Coumadin    [ ] YES          [x  ]      NO                                   PHYSICAL EXAM        Neurology: alert and oriented x 3, nonfocal, no gross deficits  CV : s1 s2 RRR  Sternal Wound :  CDI , Stable  Lungs: cta  Abdomen: soft, nontender, nondistended, positive bowel sounds, last bowel movement                       chest tubes x 2, bulb ss  :    voiding          Extremities:   trace    edema   /  -   calve tenderness ,             acetaminophen     Tablet .. 650 milliGRAM(s) Oral every 6 hours  acetaminophen   IVPB .. 1000 milliGRAM(s) IV Intermittent once  aMIOdarone    Tablet 400 milliGRAM(s) Oral two times a day  ascorbic acid 500 milliGRAM(s) Oral two times a day  aspirin enteric coated 81 milliGRAM(s) Oral daily  atorvastatin 40 milliGRAM(s) Oral at bedtime  bisacodyl Suppository 10 milliGRAM(s) Rectal once  chlorhexidine 2% Cloths 1 Application(s) Topical daily  enoxaparin Injectable 40 milliGRAM(s) SubCutaneous every 24 hours  furosemide    Tablet 20 milliGRAM(s) Oral daily  gabapentin 100 milliGRAM(s) Oral every 8 hours  HYDROmorphone   Tablet 2 milliGRAM(s) Oral every 2 hours PRN  HYDROmorphone   Tablet 4 milliGRAM(s) Oral every 4 hours PRN  HYDROmorphone  Injectable 0.5 milliGRAM(s) IV Push every 6 hours PRN  levothyroxine 88 MICROGram(s) Oral daily  metoclopramide Injectable 10 milliGRAM(s) IV Push every 8 hours  metoprolol tartrate 12.5 milliGRAM(s) Oral every 12 hours  pantoprazole    Tablet 40 milliGRAM(s) Oral before breakfast  polyethylene glycol 3350 17 Gram(s) Oral daily  potassium phosphate / sodium phosphate Tablet (K-PHOS No. 2) 1 Tablet(s) Oral four times a day with meals  senna 2 Tablet(s) Oral at bedtime  sodium chloride 0.9%. 1000 milliLiter(s) IV Continuous <Continuous>  spironolactone 25 milliGRAM(s) Oral daily                    Physical Therapy Rec:   Home  [  ]   Home w/ PT  [  ]  Rehab  [  ]  Discussed with Cardiothoracic Team at AM rounds.

## 2022-09-16 NOTE — PROGRESS NOTE ADULT - ASSESSMENT
Patient is a 45 yo female PMH asthma - well controlled - no meds,  Hashimoto's controlled with Synthroid, bicuspid aortic valve - s/p AVR 2008, Who had reports  of SOB, tiredness, dizziness for about a year, last echocardiogram revealed severe stenosis of the bioprosthetic aortic valve.  CT Angio of the heart and coronaries 7/2022 within normal. Patient is now post op day 1 from Aortic valve replacement/Ascending Aorta Replacement with Dr. Stevenson      9/14 AVR / Ascending Aortic Replacement, 5u cryo, 2 PLT, 500 FEIBA, AV Wires, R pleural chest tube,Left pleural chest tube  Diaphragmatic mediastinal chest tube, Substernal mediastinal chest tube.  Patient extubated in evening.      9/15 Lopressor 12.5mg Q12 started in AM, Cardene gtt titrated off, Hydralazine 10mg Q8 added for afterload reduction, Amio ERP for afib prophylaxis.  ASA 81mg QD and Lovenox 40mg QD started.  PO Synthroid resumed for hx of Hashimoto's.  TX to SDU in evening.  Chest tubes/RIJ intro/bailey remain in place  9/16 Low dose diuretic today.  Ambulate, d/c ct's  Received a call from radiology , small r pneumo, repeat pending.  Increase beta blocker as tolerated

## 2022-09-17 LAB
ALBUMIN SERPL ELPH-MCNC: 3.8 G/DL — SIGNIFICANT CHANGE UP (ref 3.3–5)
ALP SERPL-CCNC: 27 U/L — LOW (ref 40–120)
ALT FLD-CCNC: 13 U/L — SIGNIFICANT CHANGE UP (ref 10–45)
ANION GAP SERPL CALC-SCNC: 10 MMOL/L — SIGNIFICANT CHANGE UP (ref 5–17)
AST SERPL-CCNC: 21 U/L — SIGNIFICANT CHANGE UP (ref 10–40)
BILIRUB SERPL-MCNC: 0.4 MG/DL — SIGNIFICANT CHANGE UP (ref 0.2–1.2)
BUN SERPL-MCNC: 14 MG/DL — SIGNIFICANT CHANGE UP (ref 7–23)
CALCIUM SERPL-MCNC: 8 MG/DL — LOW (ref 8.4–10.5)
CHLORIDE SERPL-SCNC: 103 MMOL/L — SIGNIFICANT CHANGE UP (ref 96–108)
CO2 SERPL-SCNC: 25 MMOL/L — SIGNIFICANT CHANGE UP (ref 22–31)
CREAT SERPL-MCNC: 0.67 MG/DL — SIGNIFICANT CHANGE UP (ref 0.5–1.3)
EGFR: 109 ML/MIN/1.73M2 — SIGNIFICANT CHANGE UP
GLUCOSE SERPL-MCNC: 98 MG/DL — SIGNIFICANT CHANGE UP (ref 70–99)
HCT VFR BLD CALC: 31.1 % — LOW (ref 34.5–45)
HGB BLD-MCNC: 10.5 G/DL — LOW (ref 11.5–15.5)
MAGNESIUM SERPL-MCNC: 1.8 MG/DL — SIGNIFICANT CHANGE UP (ref 1.6–2.6)
MCHC RBC-ENTMCNC: 31.9 PG — SIGNIFICANT CHANGE UP (ref 27–34)
MCHC RBC-ENTMCNC: 33.8 GM/DL — SIGNIFICANT CHANGE UP (ref 32–36)
MCV RBC AUTO: 94.5 FL — SIGNIFICANT CHANGE UP (ref 80–100)
NRBC # BLD: 0 /100 WBCS — SIGNIFICANT CHANGE UP (ref 0–0)
PHOSPHATE SERPL-MCNC: 2.3 MG/DL — LOW (ref 2.5–4.5)
PLATELET # BLD AUTO: 138 K/UL — LOW (ref 150–400)
POTASSIUM SERPL-MCNC: 3.9 MMOL/L — SIGNIFICANT CHANGE UP (ref 3.5–5.3)
POTASSIUM SERPL-SCNC: 3.9 MMOL/L — SIGNIFICANT CHANGE UP (ref 3.5–5.3)
PROT SERPL-MCNC: 5.8 G/DL — LOW (ref 6–8.3)
RBC # BLD: 3.29 M/UL — LOW (ref 3.8–5.2)
RBC # FLD: 13 % — SIGNIFICANT CHANGE UP (ref 10.3–14.5)
SODIUM SERPL-SCNC: 138 MMOL/L — SIGNIFICANT CHANGE UP (ref 135–145)
WBC # BLD: 7.56 K/UL — SIGNIFICANT CHANGE UP (ref 3.8–10.5)
WBC # FLD AUTO: 7.56 K/UL — SIGNIFICANT CHANGE UP (ref 3.8–10.5)

## 2022-09-17 PROCEDURE — 71045 X-RAY EXAM CHEST 1 VIEW: CPT | Mod: 26

## 2022-09-17 RX ORDER — SPIRONOLACTONE 25 MG/1
25 TABLET, FILM COATED ORAL ONCE
Refills: 0 | Status: COMPLETED | OUTPATIENT
Start: 2022-09-17 | End: 2022-09-17

## 2022-09-17 RX ORDER — ASPIRIN/CALCIUM CARB/MAGNESIUM 324 MG
81 TABLET ORAL DAILY
Refills: 0 | Status: DISCONTINUED | OUTPATIENT
Start: 2022-09-17 | End: 2022-09-20

## 2022-09-17 RX ORDER — SPIRONOLACTONE 25 MG/1
50 TABLET, FILM COATED ORAL EVERY 12 HOURS
Refills: 0 | Status: DISCONTINUED | OUTPATIENT
Start: 2022-09-17 | End: 2022-09-20

## 2022-09-17 RX ADMIN — HYDROMORPHONE HYDROCHLORIDE 4 MILLIGRAM(S): 2 INJECTION INTRAMUSCULAR; INTRAVENOUS; SUBCUTANEOUS at 14:00

## 2022-09-17 RX ADMIN — CHLORHEXIDINE GLUCONATE 1 APPLICATION(S): 213 SOLUTION TOPICAL at 11:18

## 2022-09-17 RX ADMIN — Medication 650 MILLIGRAM(S): at 11:17

## 2022-09-17 RX ADMIN — PANTOPRAZOLE SODIUM 40 MILLIGRAM(S): 20 TABLET, DELAYED RELEASE ORAL at 05:08

## 2022-09-17 RX ADMIN — Medication 650 MILLIGRAM(S): at 11:25

## 2022-09-17 RX ADMIN — HYDROMORPHONE HYDROCHLORIDE 4 MILLIGRAM(S): 2 INJECTION INTRAMUSCULAR; INTRAVENOUS; SUBCUTANEOUS at 21:45

## 2022-09-17 RX ADMIN — Medication 81 MILLIGRAM(S): at 11:18

## 2022-09-17 RX ADMIN — Medication 88 MICROGRAM(S): at 05:08

## 2022-09-17 RX ADMIN — HYDROMORPHONE HYDROCHLORIDE 4 MILLIGRAM(S): 2 INJECTION INTRAMUSCULAR; INTRAVENOUS; SUBCUTANEOUS at 13:22

## 2022-09-17 RX ADMIN — ATORVASTATIN CALCIUM 40 MILLIGRAM(S): 80 TABLET, FILM COATED ORAL at 21:45

## 2022-09-17 RX ADMIN — SENNA PLUS 2 TABLET(S): 8.6 TABLET ORAL at 21:45

## 2022-09-17 RX ADMIN — SPIRONOLACTONE 25 MILLIGRAM(S): 25 TABLET, FILM COATED ORAL at 05:08

## 2022-09-17 RX ADMIN — Medication 650 MILLIGRAM(S): at 00:40

## 2022-09-17 RX ADMIN — Medication 500 MILLIGRAM(S): at 05:08

## 2022-09-17 RX ADMIN — AMIODARONE HYDROCHLORIDE 400 MILLIGRAM(S): 400 TABLET ORAL at 05:08

## 2022-09-17 RX ADMIN — Medication 12.5 MILLIGRAM(S): at 08:31

## 2022-09-17 RX ADMIN — Medication 20 MILLIGRAM(S): at 05:09

## 2022-09-17 RX ADMIN — HYDROMORPHONE HYDROCHLORIDE 2 MILLIGRAM(S): 2 INJECTION INTRAMUSCULAR; INTRAVENOUS; SUBCUTANEOUS at 08:30

## 2022-09-17 RX ADMIN — ENOXAPARIN SODIUM 40 MILLIGRAM(S): 100 INJECTION SUBCUTANEOUS at 15:23

## 2022-09-17 RX ADMIN — HYDROMORPHONE HYDROCHLORIDE 4 MILLIGRAM(S): 2 INJECTION INTRAMUSCULAR; INTRAVENOUS; SUBCUTANEOUS at 04:03

## 2022-09-17 RX ADMIN — GABAPENTIN 100 MILLIGRAM(S): 400 CAPSULE ORAL at 21:45

## 2022-09-17 RX ADMIN — GABAPENTIN 100 MILLIGRAM(S): 400 CAPSULE ORAL at 05:08

## 2022-09-17 RX ADMIN — HYDROMORPHONE HYDROCHLORIDE 4 MILLIGRAM(S): 2 INJECTION INTRAMUSCULAR; INTRAVENOUS; SUBCUTANEOUS at 04:33

## 2022-09-17 RX ADMIN — SPIRONOLACTONE 50 MILLIGRAM(S): 25 TABLET, FILM COATED ORAL at 17:17

## 2022-09-17 RX ADMIN — POLYETHYLENE GLYCOL 3350 17 GRAM(S): 17 POWDER, FOR SOLUTION ORAL at 11:17

## 2022-09-17 RX ADMIN — Medication 1 TABLET(S): at 08:33

## 2022-09-17 RX ADMIN — GABAPENTIN 100 MILLIGRAM(S): 400 CAPSULE ORAL at 13:22

## 2022-09-17 RX ADMIN — Medication 650 MILLIGRAM(S): at 05:09

## 2022-09-17 RX ADMIN — SPIRONOLACTONE 25 MILLIGRAM(S): 25 TABLET, FILM COATED ORAL at 11:17

## 2022-09-17 RX ADMIN — HYDROMORPHONE HYDROCHLORIDE 4 MILLIGRAM(S): 2 INJECTION INTRAMUSCULAR; INTRAVENOUS; SUBCUTANEOUS at 22:30

## 2022-09-17 RX ADMIN — HYDROMORPHONE HYDROCHLORIDE 2 MILLIGRAM(S): 2 INJECTION INTRAMUSCULAR; INTRAVENOUS; SUBCUTANEOUS at 09:05

## 2022-09-17 NOTE — PROGRESS NOTE ADULT - ASSESSMENT
Patient is a 45 yo female PMH asthma - well controlled - no meds,  Hashimoto's controlled with Synthroid, bicuspid aortic valve - s/p AVR 2008, Who had reports  of SOB, tiredness, dizziness for about a year, last echocardiogram revealed severe stenosis of the bioprosthetic aortic valve.  CT Angio of the heart and coronaries 7/2022 within normal. Patient is now post op day 1 from Aortic valve replacement/Ascending Aorta Replacement with Dr. Stevenson      9/14 AVR / Ascending Aortic Replacement, 5u cryo, 2 PLT, 500 FEIBA, AV Wires, R pleural chest tube,Left pleural chest tube  Diaphragmatic mediastinal chest tube, Substernal mediastinal chest tube.  Patient extubated in evening.      9/15 Lopressor 12.5mg Q12 started in AM, Cardene gtt titrated off, Hydralazine 10mg Q8 added for afterload reduction, Amio ERP for afib prophylaxis.  ASA 81mg QD and Lovenox 40mg QD started.  PO Synthroid resumed for hx of Hashimoto's.  TX to SDU in evening.  Chest tubes/RIJ intro/bailey remain in place  9/16 Low dose diuretic today.  Ambulate, d/c ct'sReceived a call from radiology , small r pneumo, repeat pending.  Increase beta blocker as tolerated  9/17 VSS     Patient is a 47 yo female PMH asthma - well controlled - no meds,  Hashimoto's controlled with Synthroid, bicuspid aortic valve - s/p AVR 2008, Who had reports  of SOB, tiredness, dizziness for about a year, last echocardiogram revealed severe stenosis of the bioprosthetic aortic valve.  CT Angio of the heart and coronaries 7/2022 within normal. Patient is now post op day 1 from Aortic valve replacement/Ascending Aorta Replacement with Dr. Stevenson      9/14 AVR / Ascending Aortic Replacement, 5u cryo, 2 PLT, 500 FEIBA, AV Wires, R pleural chest tube,Left pleural chest tube  Diaphragmatic mediastinal chest tube, Substernal mediastinal chest tube.  Patient extubated in evening.      9/15 Lopressor 12.5mg Q12 started in AM, Cardene gtt titrated off, Hydralazine 10mg Q8 added for afterload reduction, Amio ERP for afib prophylaxis.  ASA 81mg QD and Lovenox 40mg QD started.  PO Synthroid resumed for hx of Hashimoto's.  TX to SDU in evening.  Chest tubes/RIJ intro/bailey remain in place  9/16 Low dose diuretic today.  Ambulate, d/c ct'sReceived a call from radiology , small r pneumo, repeat pending.  Increase beta blocker as tolerated  9/17 VSS aldactone increased to 50 bid lopressor 12.5 bid maintain pacing wires transfer to floor disposition to home

## 2022-09-17 NOTE — PROGRESS NOTE ADULT - PROBLEM SELECTOR PLAN 1
9/14 AVR / Ascending Aortic Replacement    9/15 TX to SDU    -Continue Lopressor 12.5mg BID and titrate as tolerated  Lovenox 40 qd   -Continue ASA 81mg/  diuresis lasix 20 mg po an aldactone 25 qd  + PW VVI 50/10  -Patient encouraged to get OOB to chair as tolerated, increase ambulation and perform coughing/deep breathing exercises with heart pillow brace.  dispostion to home 9/14 AVR / Ascending Aortic Replacement  9/15 TX to SDU  -Continue Lopressor 12.5mg BID and titrate as tolerated  Lovenox 40 qd   -Continue ASA 81mg/  diuresis lasix 20 mg po an aldactone 50 bid  + PW VVI 50/10  -Patient encouraged to get OOB to chair as tolerated, increase ambulation and perform coughing/deep breathing exercises with heart pillow brace.  dispostion to home

## 2022-09-17 NOTE — PROGRESS NOTE ADULT - SUBJECTIVE AND OBJECTIVE BOX
Subjective    VITAL SIGNS    Telemetry:  NSR 60-70    Vital Signs Last 24 Hrs  T(C): 36.8 (22 @ 07:22), Max: 36.9 (22 @ 23:20)  T(F): 98.2 (22 @ 07:22), Max: 98.5 (22 @ 23:20)  HR: 72 (22 @ 07:22) (62 - 74)  BP: 104/61 (22 @ 07:22) (90/48 - 125/59)  RR: 18 (22 @ 07:22) (18 - 18)  SpO2: 98% (22 @ 07:22) (95% - 100%)            @ 07:01  -   @ 07:00  --------------------------------------------------------  IN: 880 mL / OUT: 2250 mL / NET: -1370 mL      Daily Weight in k.3 (17 Sep 2022 07:22)                            10.5   7.56  )-----------( 138      ( 17 Sep 2022 05:55 )             31.1         138  |  103  |  14  ----------------------------<  98  3.9   |  25  |  0.67    Ca    8.0<L>      17 Sep 2022 05:56  Phos  2.3       Mg     1.8         TPro  5.8<L>  /  Alb  3.8  /  TBili  0.4  /  DBili  x   /  AST  21  /  ALT  13  /  AlkPhos  27<L>    MEDICATIONS  (STANDING):  acetaminophen     Tablet .. 650 milliGRAM(s) Oral every 6 hours  ascorbic acid 500 milliGRAM(s) Oral two times a day  aspirin enteric coated 81 milliGRAM(s) Oral daily  atorvastatin 40 milliGRAM(s) Oral at bedtime  bisacodyl Suppository 10 milliGRAM(s) Rectal once  chlorhexidine 2% Cloths 1 Application(s) Topical daily  enoxaparin Injectable 40 milliGRAM(s) SubCutaneous every 24 hours  furosemide    Tablet 20 milliGRAM(s) Oral daily  gabapentin 100 milliGRAM(s) Oral every 8 hours  levothyroxine 88 MICROGram(s) Oral daily  metoprolol tartrate 12.5 milliGRAM(s) Oral every 12 hours  pantoprazole    Tablet 40 milliGRAM(s) Oral before breakfast  polyethylene glycol 3350 17 Gram(s) Oral daily  potassium phosphate / sodium phosphate Tablet (K-PHOS No. 2) 1 Tablet(s) Oral four times a day with meals  senna 2 Tablet(s) Oral at bedtime  sodium chloride 0.9%. 1000 milliLiter(s) (10 mL/Hr) IV Continuous <Continuous>  spironolactone 25 milliGRAM(s) Oral daily    MEDICATIONS  (PRN):  acetaminophen     Tablet .. 650 milliGRAM(s) Oral every 6 hours PRN Mild Pain (1 - 3)  HYDROmorphone   Tablet 2 milliGRAM(s) Oral every 2 hours PRN Moderate Pain (4 - 6)  HYDROmorphone   Tablet 4 milliGRAM(s) Oral every 4 hours PRN Severe Pain (7 - 10)        CAPILLARY BLOOD GLUCOSE           Pacing Wires                                    Isolated  [  +}                                PHYSICAL EXAM        Neurology: alert and oriented x 3, nonfocal, no gross deficits    CV :[id number]    Sternal Wound :  CDI , Stable    Lungs:    Abdomen: soft, nontender, nondistended, positive bowel sounds, last bowel movement     : voids              Extremities:                                           Physical Therapy Rec:   Home  [ x ]   Home w/ PT  [  ]  Rehab  [  ]    Discussed with Cardiothoracic Team at AM rounds. Subjective " hello I am just tired"    VITAL SIGNS    Telemetry:  NSR 60-70    Vital Signs Last 24 Hrs  T(C): 36.8 (22 @ 07:22), Max: 36.9 (22 @ 23:20)  T(F): 98.2 (22 @ 07:22), Max: 98.5 (22 @ 23:20)  HR: 72 (22 @ 07:22) (62 - 74)  BP: 104/61 (22 @ 07:22) (90/48 - 125/59)  RR: 18 (22 @ 07:22) (18 - 18)  SpO2: 98% (22 @ 07:22) (95% - 100%)            @ 07:01  -   @ 07:00  --------------------------------------------------------  IN: 880 mL / OUT: 2250 mL / NET: -1370 mL      Daily Weight in k.3 (17 Sep 2022 07:22)                            10.5   7.56  )-----------( 138      ( 17 Sep 2022 05:55 )             31.1         138  |  103  |  14  ----------------------------<  98  3.9   |  25  |  0.67    Ca    8.0<L>      17 Sep 2022 05:56  Phos  2.3       Mg     1.8         TPro  5.8<L>  /  Alb  3.8  /  TBili  0.4  /  DBili  x   /  AST  21  /  ALT  13  /  AlkPhos  27<L>    MEDICATIONS  (STANDING):  acetaminophen     Tablet .. 650 milliGRAM(s) Oral every 6 hours  ascorbic acid 500 milliGRAM(s) Oral two times a day  aspirin enteric coated 81 milliGRAM(s) Oral daily  atorvastatin 40 milliGRAM(s) Oral at bedtime  bisacodyl Suppository 10 milliGRAM(s) Rectal once  chlorhexidine 2% Cloths 1 Application(s) Topical daily  enoxaparin Injectable 40 milliGRAM(s) SubCutaneous every 24 hours  furosemide    Tablet 20 milliGRAM(s) Oral daily  gabapentin 100 milliGRAM(s) Oral every 8 hours  levothyroxine 88 MICROGram(s) Oral daily  metoprolol tartrate 12.5 milliGRAM(s) Oral every 12 hours  pantoprazole    Tablet 40 milliGRAM(s) Oral before breakfast  polyethylene glycol 3350 17 Gram(s) Oral daily  potassium phosphate / sodium phosphate Tablet (K-PHOS No. 2) 1 Tablet(s) Oral four times a day with meals  senna 2 Tablet(s) Oral at bedtime  sodium chloride 0.9%. 1000 milliLiter(s) (10 mL/Hr) IV Continuous <Continuous>  spironolactone 25 milliGRAM(s) Oral daily    MEDICATIONS  (PRN):  acetaminophen     Tablet .. 650 milliGRAM(s) Oral every 6 hours PRN Mild Pain (1 - 3)  HYDROmorphone   Tablet 2 milliGRAM(s) Oral every 2 hours PRN Moderate Pain (4 - 6)  HYDROmorphone   Tablet 4 milliGRAM(s) Oral every 4 hours PRN Severe Pain (7 - 10)        CAPILLARY BLOOD GLUCOSE           Pacing Wires                                    Isolated  [  +}                                PHYSICAL EXAM        Neurology: alert and oriented x 3, nonfocal, no gross deficits    CV :[id number]    Sternal Wound :  CDI , Stable    Lungs: B/l CTA on roomair     Abdomen: soft, nontender, nondistended, positive bowel sounds, last bowel movement     : voids              Extremities:    warm well perfused equal strength   B/lle + DP +  edema    no calf tenderness                                      Physical Therapy Rec:   Home  [ x ]   Home w/ PT  [  ]  Rehab  [  ]    Discussed with Cardiothoracic Team at AM rounds.

## 2022-09-18 LAB
ALBUMIN SERPL ELPH-MCNC: 3.9 G/DL — SIGNIFICANT CHANGE UP (ref 3.3–5)
ALP SERPL-CCNC: 31 U/L — LOW (ref 40–120)
ALT FLD-CCNC: 19 U/L — SIGNIFICANT CHANGE UP (ref 10–45)
ANION GAP SERPL CALC-SCNC: 13 MMOL/L — SIGNIFICANT CHANGE UP (ref 5–17)
APTT BLD: 28.6 SEC — SIGNIFICANT CHANGE UP (ref 27.5–35.5)
AST SERPL-CCNC: 25 U/L — SIGNIFICANT CHANGE UP (ref 10–40)
BASOPHILS # BLD AUTO: 0.02 K/UL — SIGNIFICANT CHANGE UP (ref 0–0.2)
BASOPHILS NFR BLD AUTO: 0.4 % — SIGNIFICANT CHANGE UP (ref 0–2)
BILIRUB SERPL-MCNC: 0.4 MG/DL — SIGNIFICANT CHANGE UP (ref 0.2–1.2)
BUN SERPL-MCNC: 13 MG/DL — SIGNIFICANT CHANGE UP (ref 7–23)
CALCIUM SERPL-MCNC: 8.7 MG/DL — SIGNIFICANT CHANGE UP (ref 8.4–10.5)
CHLORIDE SERPL-SCNC: 100 MMOL/L — SIGNIFICANT CHANGE UP (ref 96–108)
CO2 SERPL-SCNC: 25 MMOL/L — SIGNIFICANT CHANGE UP (ref 22–31)
CREAT SERPL-MCNC: 0.74 MG/DL — SIGNIFICANT CHANGE UP (ref 0.5–1.3)
EGFR: 101 ML/MIN/1.73M2 — SIGNIFICANT CHANGE UP
EOSINOPHIL # BLD AUTO: 0.03 K/UL — SIGNIFICANT CHANGE UP (ref 0–0.5)
EOSINOPHIL NFR BLD AUTO: 0.5 % — SIGNIFICANT CHANGE UP (ref 0–6)
GLUCOSE SERPL-MCNC: 78 MG/DL — SIGNIFICANT CHANGE UP (ref 70–99)
HCT VFR BLD CALC: 32.9 % — LOW (ref 34.5–45)
HGB BLD-MCNC: 10.7 G/DL — LOW (ref 11.5–15.5)
IMM GRANULOCYTES NFR BLD AUTO: 0.7 % — SIGNIFICANT CHANGE UP (ref 0–0.9)
INR BLD: 1.14 RATIO — SIGNIFICANT CHANGE UP (ref 0.88–1.16)
LMWH PPP CHRO-ACNC: 0.03 IU/ML — LOW (ref 0.5–1.1)
LYMPHOCYTES # BLD AUTO: 0.77 K/UL — LOW (ref 1–3.3)
LYMPHOCYTES # BLD AUTO: 13.5 % — SIGNIFICANT CHANGE UP (ref 13–44)
MCHC RBC-ENTMCNC: 30.7 PG — SIGNIFICANT CHANGE UP (ref 27–34)
MCHC RBC-ENTMCNC: 32.5 GM/DL — SIGNIFICANT CHANGE UP (ref 32–36)
MCV RBC AUTO: 94.3 FL — SIGNIFICANT CHANGE UP (ref 80–100)
MONOCYTES # BLD AUTO: 0.31 K/UL — SIGNIFICANT CHANGE UP (ref 0–0.9)
MONOCYTES NFR BLD AUTO: 5.4 % — SIGNIFICANT CHANGE UP (ref 2–14)
NEUTROPHILS # BLD AUTO: 4.53 K/UL — SIGNIFICANT CHANGE UP (ref 1.8–7.4)
NEUTROPHILS NFR BLD AUTO: 79.5 % — HIGH (ref 43–77)
NRBC # BLD: 0 /100 WBCS — SIGNIFICANT CHANGE UP (ref 0–0)
PLATELET # BLD AUTO: 154 K/UL — SIGNIFICANT CHANGE UP (ref 150–400)
POTASSIUM SERPL-MCNC: 3.8 MMOL/L — SIGNIFICANT CHANGE UP (ref 3.5–5.3)
POTASSIUM SERPL-SCNC: 3.8 MMOL/L — SIGNIFICANT CHANGE UP (ref 3.5–5.3)
PROT SERPL-MCNC: 6.3 G/DL — SIGNIFICANT CHANGE UP (ref 6–8.3)
PROTHROM AB SERPL-ACNC: 13.3 SEC — SIGNIFICANT CHANGE UP (ref 10.5–13.4)
RBC # BLD: 3.49 M/UL — LOW (ref 3.8–5.2)
RBC # FLD: 12.8 % — SIGNIFICANT CHANGE UP (ref 10.3–14.5)
SODIUM SERPL-SCNC: 138 MMOL/L — SIGNIFICANT CHANGE UP (ref 135–145)
WBC # BLD: 5.7 K/UL — SIGNIFICANT CHANGE UP (ref 3.8–10.5)
WBC # FLD AUTO: 5.7 K/UL — SIGNIFICANT CHANGE UP (ref 3.8–10.5)

## 2022-09-18 PROCEDURE — 71045 X-RAY EXAM CHEST 1 VIEW: CPT | Mod: 26

## 2022-09-18 PROCEDURE — 93010 ELECTROCARDIOGRAM REPORT: CPT

## 2022-09-18 RX ORDER — LIDOCAINE 4 G/100G
1 CREAM TOPICAL ONCE
Refills: 0 | Status: COMPLETED | OUTPATIENT
Start: 2022-09-18 | End: 2022-09-18

## 2022-09-18 RX ORDER — POTASSIUM CHLORIDE 20 MEQ
20 PACKET (EA) ORAL ONCE
Refills: 0 | Status: COMPLETED | OUTPATIENT
Start: 2022-09-18 | End: 2022-09-18

## 2022-09-18 RX ORDER — MAGNESIUM SULFATE 500 MG/ML
1 VIAL (ML) INJECTION ONCE
Refills: 0 | Status: COMPLETED | OUTPATIENT
Start: 2022-09-18 | End: 2022-09-18

## 2022-09-18 RX ORDER — ACETAMINOPHEN 500 MG
1000 TABLET ORAL ONCE
Refills: 0 | Status: COMPLETED | OUTPATIENT
Start: 2022-09-18 | End: 2022-09-18

## 2022-09-18 RX ORDER — LANOLIN ALCOHOL/MO/W.PET/CERES
3 CREAM (GRAM) TOPICAL AT BEDTIME
Refills: 0 | Status: DISCONTINUED | OUTPATIENT
Start: 2022-09-18 | End: 2022-09-20

## 2022-09-18 RX ADMIN — Medication 12.5 MILLIGRAM(S): at 17:12

## 2022-09-18 RX ADMIN — SPIRONOLACTONE 50 MILLIGRAM(S): 25 TABLET, FILM COATED ORAL at 15:19

## 2022-09-18 RX ADMIN — GABAPENTIN 100 MILLIGRAM(S): 400 CAPSULE ORAL at 21:46

## 2022-09-18 RX ADMIN — CHLORHEXIDINE GLUCONATE 1 APPLICATION(S): 213 SOLUTION TOPICAL at 11:40

## 2022-09-18 RX ADMIN — ENOXAPARIN SODIUM 40 MILLIGRAM(S): 100 INJECTION SUBCUTANEOUS at 15:19

## 2022-09-18 RX ADMIN — Medication 500 MILLIGRAM(S): at 17:10

## 2022-09-18 RX ADMIN — Medication 500 MILLIGRAM(S): at 05:23

## 2022-09-18 RX ADMIN — Medication 88 MICROGRAM(S): at 05:22

## 2022-09-18 RX ADMIN — LIDOCAINE 1 PATCH: 4 CREAM TOPICAL at 06:35

## 2022-09-18 RX ADMIN — LIDOCAINE 1 PATCH: 4 CREAM TOPICAL at 14:00

## 2022-09-18 RX ADMIN — Medication 400 MILLIGRAM(S): at 01:42

## 2022-09-18 RX ADMIN — GABAPENTIN 100 MILLIGRAM(S): 400 CAPSULE ORAL at 05:36

## 2022-09-18 RX ADMIN — Medication 20 MILLIGRAM(S): at 05:22

## 2022-09-18 RX ADMIN — Medication 100 GRAM(S): at 05:36

## 2022-09-18 RX ADMIN — SPIRONOLACTONE 50 MILLIGRAM(S): 25 TABLET, FILM COATED ORAL at 05:23

## 2022-09-18 RX ADMIN — LIDOCAINE 1 PATCH: 4 CREAM TOPICAL at 02:10

## 2022-09-18 RX ADMIN — POLYETHYLENE GLYCOL 3350 17 GRAM(S): 17 POWDER, FOR SOLUTION ORAL at 11:38

## 2022-09-18 RX ADMIN — Medication 81 MILLIGRAM(S): at 11:38

## 2022-09-18 RX ADMIN — HYDROMORPHONE HYDROCHLORIDE 4 MILLIGRAM(S): 2 INJECTION INTRAMUSCULAR; INTRAVENOUS; SUBCUTANEOUS at 21:46

## 2022-09-18 RX ADMIN — HYDROMORPHONE HYDROCHLORIDE 4 MILLIGRAM(S): 2 INJECTION INTRAMUSCULAR; INTRAVENOUS; SUBCUTANEOUS at 11:30

## 2022-09-18 RX ADMIN — PANTOPRAZOLE SODIUM 40 MILLIGRAM(S): 20 TABLET, DELAYED RELEASE ORAL at 05:23

## 2022-09-18 RX ADMIN — Medication 3 MILLIGRAM(S): at 21:45

## 2022-09-18 RX ADMIN — HYDROMORPHONE HYDROCHLORIDE 4 MILLIGRAM(S): 2 INJECTION INTRAMUSCULAR; INTRAVENOUS; SUBCUTANEOUS at 11:38

## 2022-09-18 RX ADMIN — SENNA PLUS 2 TABLET(S): 8.6 TABLET ORAL at 21:45

## 2022-09-18 RX ADMIN — HYDROMORPHONE HYDROCHLORIDE 4 MILLIGRAM(S): 2 INJECTION INTRAMUSCULAR; INTRAVENOUS; SUBCUTANEOUS at 17:30

## 2022-09-18 RX ADMIN — Medication 1000 MILLIGRAM(S): at 01:42

## 2022-09-18 RX ADMIN — Medication 12.5 MILLIGRAM(S): at 05:23

## 2022-09-18 RX ADMIN — HYDROMORPHONE HYDROCHLORIDE 4 MILLIGRAM(S): 2 INJECTION INTRAMUSCULAR; INTRAVENOUS; SUBCUTANEOUS at 22:20

## 2022-09-18 RX ADMIN — ATORVASTATIN CALCIUM 40 MILLIGRAM(S): 80 TABLET, FILM COATED ORAL at 21:45

## 2022-09-18 RX ADMIN — GABAPENTIN 100 MILLIGRAM(S): 400 CAPSULE ORAL at 15:19

## 2022-09-18 RX ADMIN — Medication 20 MILLIEQUIVALENT(S): at 05:22

## 2022-09-18 RX ADMIN — HYDROMORPHONE HYDROCHLORIDE 4 MILLIGRAM(S): 2 INJECTION INTRAMUSCULAR; INTRAVENOUS; SUBCUTANEOUS at 17:11

## 2022-09-18 NOTE — PROGRESS NOTE ADULT - ASSESSMENT
Patient is a 47 yo female PMH asthma - well controlled - no meds,  Hashimoto's controlled with Synthroid, bicuspid aortic valve - s/p AVR 2008, Who had reports  of SOB, tiredness, dizziness for about a year, last echocardiogram revealed severe stenosis of the bioprosthetic aortic valve.  CT Angio of the heart and coronaries 7/2022 within normal. Patient is now post op day 1 from Aortic valve replacement/Ascending Aorta Replacement with Dr. Stevenson      9/14 AVR / Ascending Aortic Replacement, 5u cryo, 2 PLT, 500 FEIBA, AV Wires, R pleural chest tube,Left pleural chest tube  Diaphragmatic mediastinal chest tube, Substernal mediastinal chest tube.  Patient extubated in evening.      9/15 Lopressor 12.5mg Q12 started in AM, Cardene gtt titrated off, Hydralazine 10mg Q8 added for afterload reduction, Amio ERP for afib prophylaxis.  ASA 81mg QD and Lovenox 40mg QD started.  PO Synthroid resumed for hx of Hashimoto's.  TX to SDU in evening.  Chest tubes/RIJ intro/bailey remain in place  9/16 Low dose diuretic today.  Ambulate, d/c ct'sReceived a call from radiology , small r pneumo, repeat pending.  Increase beta blocker as tolerated  9/17 VSS aldactone increased to 50 bid lopressor 12.5 bid maintain pacing wires transfer to floor disposition to home  9/18 VSS - no complaints.  pws d/c'd probable d/c home Monday.

## 2022-09-18 NOTE — PROVIDER CONTACT NOTE (OTHER) - ASSESSMENT
Procedure(s):  LEFT TOTAL KNEE ARTHROPLASTY, MAKOPLASTY. spinal, regional    Anesthesia Post Evaluation        Patient location during evaluation: bedside  Level of consciousness: awake  Pain management: satisfactory to patient  Airway patency: patent  Anesthetic complications: no  Cardiovascular status: acceptable  Respiratory status: acceptable  Hydration status: acceptable        INITIAL Post-op Vital signs:   Vitals Value Taken Time   /68 01/03/22 1545   Temp 36.4 °C (97.6 °F) 01/03/22 1235   Pulse 76 01/03/22 1552   Resp 14 01/03/22 1552   SpO2 93 % 01/03/22 1552   Vitals shown include unvalidated device data. EKG done, V/S completed,

## 2022-09-18 NOTE — PROGRESS NOTE ADULT - PROBLEM SELECTOR PLAN 1
9/14 AVR / Ascending Aortic Replacement  9/15 TX to SDU  -Continue Lopressor 12.5mg BID and titrate as tolerated  Lovenox 40 qd   -Continue ASA 81mg/  diuresis lasix 20 mg po an aldactone 50 bid  + PW VVI 50/10  -Patient encouraged to get OOB to chair as tolerated, increase ambulation and perform coughing/deep breathing exercises with heart pillow brace.  dispostion to home

## 2022-09-18 NOTE — PROGRESS NOTE ADULT - SUBJECTIVE AND OBJECTIVE BOX
VITAL SIGNS-Telemetry:  SR 60-70  Vital Signs Last 24 Hrs  T(C): 37 (09-18-22 @ 05:05), Max: 37 (09-18-22 @ 05:05)  T(F): 98.6 (09-18-22 @ 05:05), Max: 98.6 (09-18-22 @ 05:05)  HR: 71 (09-18-22 @ 05:05) (65 - 73)  BP: 96/60 (09-18-22 @ 05:05) (93/55 - 109/67)  RR: 18 (09-18-22 @ 05:05) (18 - 18)  SpO2: 97% (09-18-22 @ 05:05) (96% - 100%)         09-17 @ 07:01  -  09-18 @ 07:00  --------------------------------------------------------  IN: 760 mL / OUT: 800 mL / NET: -40 mL    Daily     Daily     Pacing Wires        [  ]   Settings:                                  Isolated  [ x ]  Coumadin    [ ] YES          [  ]      NO         Reason:       PHYSICAL EXAM:  Neurology: alert and oriented x 3, nonfocal, no gross deficits  CV : S1S2  Sternal Wound :  CDI , Stable  Lungs: cta  Abdomen: soft, nontender, nondistended, positive bowel sounds, last bowel movement       9/17  Extremities:     no c/c/e    acetaminophen     Tablet .. 650 milliGRAM(s) Oral every 6 hours PRN  ascorbic acid 500 milliGRAM(s) Oral two times a day  aspirin enteric coated 81 milliGRAM(s) Oral daily  atorvastatin 40 milliGRAM(s) Oral at bedtime  bisacodyl Suppository 10 milliGRAM(s) Rectal once  chlorhexidine 2% Cloths 1 Application(s) Topical daily  enoxaparin Injectable 40 milliGRAM(s) SubCutaneous every 24 hours  furosemide    Tablet 20 milliGRAM(s) Oral daily  gabapentin 100 milliGRAM(s) Oral every 8 hours  HYDROmorphone   Tablet 2 milliGRAM(s) Oral every 2 hours PRN  HYDROmorphone   Tablet 4 milliGRAM(s) Oral every 4 hours PRN  levothyroxine 88 MICROGram(s) Oral daily  metoprolol tartrate 12.5 milliGRAM(s) Oral every 12 hours  pantoprazole    Tablet 40 milliGRAM(s) Oral before breakfast  polyethylene glycol 3350 17 Gram(s) Oral daily  senna 2 Tablet(s) Oral at bedtime  sodium chloride 0.9%. 1000 milliLiter(s) IV Continuous <Continuous>  spironolactone 50 milliGRAM(s) Oral every 12 hours    Physical Therapy Rec:   Home  [ x ]   Home w/ PT  [  ]  Rehab  [  ]  Discussed with Cardiothoracic Team at AM rounds.

## 2022-09-19 LAB
ANION GAP SERPL CALC-SCNC: 12 MMOL/L — SIGNIFICANT CHANGE UP (ref 5–17)
BUN SERPL-MCNC: 15 MG/DL — SIGNIFICANT CHANGE UP (ref 7–23)
CALCIUM SERPL-MCNC: 9.2 MG/DL — SIGNIFICANT CHANGE UP (ref 8.4–10.5)
CHLORIDE SERPL-SCNC: 96 MMOL/L — SIGNIFICANT CHANGE UP (ref 96–108)
CO2 SERPL-SCNC: 29 MMOL/L — SIGNIFICANT CHANGE UP (ref 22–31)
CREAT SERPL-MCNC: 0.78 MG/DL — SIGNIFICANT CHANGE UP (ref 0.5–1.3)
EGFR: 95 ML/MIN/1.73M2 — SIGNIFICANT CHANGE UP
GLUCOSE SERPL-MCNC: 81 MG/DL — SIGNIFICANT CHANGE UP (ref 70–99)
HCT VFR BLD CALC: 37 % — SIGNIFICANT CHANGE UP (ref 34.5–45)
HGB BLD-MCNC: 12.3 G/DL — SIGNIFICANT CHANGE UP (ref 11.5–15.5)
MCHC RBC-ENTMCNC: 31.1 PG — SIGNIFICANT CHANGE UP (ref 27–34)
MCHC RBC-ENTMCNC: 33.2 GM/DL — SIGNIFICANT CHANGE UP (ref 32–36)
MCV RBC AUTO: 93.4 FL — SIGNIFICANT CHANGE UP (ref 80–100)
NRBC # BLD: 0 /100 WBCS — SIGNIFICANT CHANGE UP (ref 0–0)
PLATELET # BLD AUTO: 190 K/UL — SIGNIFICANT CHANGE UP (ref 150–400)
POTASSIUM SERPL-MCNC: 4.1 MMOL/L — SIGNIFICANT CHANGE UP (ref 3.5–5.3)
POTASSIUM SERPL-SCNC: 4.1 MMOL/L — SIGNIFICANT CHANGE UP (ref 3.5–5.3)
RBC # BLD: 3.96 M/UL — SIGNIFICANT CHANGE UP (ref 3.8–5.2)
RBC # FLD: 12.5 % — SIGNIFICANT CHANGE UP (ref 10.3–14.5)
SODIUM SERPL-SCNC: 137 MMOL/L — SIGNIFICANT CHANGE UP (ref 135–145)
WBC # BLD: 5.65 K/UL — SIGNIFICANT CHANGE UP (ref 3.8–10.5)
WBC # FLD AUTO: 5.65 K/UL — SIGNIFICANT CHANGE UP (ref 3.8–10.5)

## 2022-09-19 RX ADMIN — SENNA PLUS 2 TABLET(S): 8.6 TABLET ORAL at 22:13

## 2022-09-19 RX ADMIN — HYDROMORPHONE HYDROCHLORIDE 4 MILLIGRAM(S): 2 INJECTION INTRAMUSCULAR; INTRAVENOUS; SUBCUTANEOUS at 05:41

## 2022-09-19 RX ADMIN — PANTOPRAZOLE SODIUM 40 MILLIGRAM(S): 20 TABLET, DELAYED RELEASE ORAL at 05:39

## 2022-09-19 RX ADMIN — HYDROMORPHONE HYDROCHLORIDE 2 MILLIGRAM(S): 2 INJECTION INTRAMUSCULAR; INTRAVENOUS; SUBCUTANEOUS at 19:27

## 2022-09-19 RX ADMIN — ENOXAPARIN SODIUM 40 MILLIGRAM(S): 100 INJECTION SUBCUTANEOUS at 15:04

## 2022-09-19 RX ADMIN — HYDROMORPHONE HYDROCHLORIDE 2 MILLIGRAM(S): 2 INJECTION INTRAMUSCULAR; INTRAVENOUS; SUBCUTANEOUS at 22:13

## 2022-09-19 RX ADMIN — Medication 12.5 MILLIGRAM(S): at 05:39

## 2022-09-19 RX ADMIN — Medication 500 MILLIGRAM(S): at 05:40

## 2022-09-19 RX ADMIN — HYDROMORPHONE HYDROCHLORIDE 2 MILLIGRAM(S): 2 INJECTION INTRAMUSCULAR; INTRAVENOUS; SUBCUTANEOUS at 22:58

## 2022-09-19 RX ADMIN — SPIRONOLACTONE 50 MILLIGRAM(S): 25 TABLET, FILM COATED ORAL at 06:27

## 2022-09-19 RX ADMIN — HYDROMORPHONE HYDROCHLORIDE 2 MILLIGRAM(S): 2 INJECTION INTRAMUSCULAR; INTRAVENOUS; SUBCUTANEOUS at 11:43

## 2022-09-19 RX ADMIN — GABAPENTIN 100 MILLIGRAM(S): 400 CAPSULE ORAL at 15:04

## 2022-09-19 RX ADMIN — Medication 88 MICROGRAM(S): at 05:39

## 2022-09-19 RX ADMIN — HYDROMORPHONE HYDROCHLORIDE 4 MILLIGRAM(S): 2 INJECTION INTRAMUSCULAR; INTRAVENOUS; SUBCUTANEOUS at 06:15

## 2022-09-19 RX ADMIN — POLYETHYLENE GLYCOL 3350 17 GRAM(S): 17 POWDER, FOR SOLUTION ORAL at 11:43

## 2022-09-19 RX ADMIN — GABAPENTIN 100 MILLIGRAM(S): 400 CAPSULE ORAL at 05:40

## 2022-09-19 RX ADMIN — Medication 20 MILLIGRAM(S): at 05:39

## 2022-09-19 RX ADMIN — HYDROMORPHONE HYDROCHLORIDE 2 MILLIGRAM(S): 2 INJECTION INTRAMUSCULAR; INTRAVENOUS; SUBCUTANEOUS at 12:30

## 2022-09-19 RX ADMIN — Medication 3 MILLIGRAM(S): at 22:13

## 2022-09-19 RX ADMIN — HYDROMORPHONE HYDROCHLORIDE 2 MILLIGRAM(S): 2 INJECTION INTRAMUSCULAR; INTRAVENOUS; SUBCUTANEOUS at 18:27

## 2022-09-19 RX ADMIN — ATORVASTATIN CALCIUM 40 MILLIGRAM(S): 80 TABLET, FILM COATED ORAL at 22:13

## 2022-09-19 RX ADMIN — Medication 12.5 MILLIGRAM(S): at 18:27

## 2022-09-19 RX ADMIN — SPIRONOLACTONE 50 MILLIGRAM(S): 25 TABLET, FILM COATED ORAL at 15:04

## 2022-09-19 RX ADMIN — CHLORHEXIDINE GLUCONATE 1 APPLICATION(S): 213 SOLUTION TOPICAL at 11:15

## 2022-09-19 RX ADMIN — Medication 81 MILLIGRAM(S): at 11:43

## 2022-09-19 NOTE — PROGRESS NOTE ADULT - ASSESSMENT
Patient is a 47 yo female PMH asthma - well controlled - no meds,  Hashimoto's controlled with Synthroid, bicuspid aortic valve - s/p AVR 2008, Who had reports  of SOB, tiredness, dizziness for about a year, last echocardiogram revealed severe stenosis of the bioprosthetic aortic valve.  CT Angio of the heart and coronaries 7/2022 within normal. Patient is now post op day 1 from Aortic valve replacement/Ascending Aorta Replacement with Dr. Stevenson      9/14 AVR / Ascending Aortic Replacement, 5u cryo, 2 PLT, 500 FEIBA, AV Wires, R pleural chest tube,Left pleural chest tube  Diaphragmatic mediastinal chest tube, Substernal mediastinal chest tube.  Patient extubated in evening.      9/15 Lopressor 12.5mg Q12 started in AM, Cardene gtt titrated off, Hydralazine 10mg Q8 added for afterload reduction, Amio ERP for afib prophylaxis.  ASA 81mg QD and Lovenox 40mg QD started.  PO Synthroid resumed for hx of Hashimoto's.  TX to SDU in evening.  Chest tubes/RIJ intro/bailey remain in place  9/16 Low dose diuretic today.  Ambulate, d/c ct'sReceived a call from radiology , small r pneumo, repeat pending.  Increase beta blocker as tolerated  9/17 VSS aldactone increased to 50 bid lopressor 12.5 bid maintain pacing wires transfer to floor disposition to home  9/18 VSS - no complaints.  pws d/c'd probable d/c home Monday.     Patient is a 47 yo female PMH asthma - well controlled - no meds,  Hashimoto's controlled with Synthroid, bicuspid aortic valve - s/p AVR 2008, Who had reports  of SOB, tiredness, dizziness for about a year, last echocardiogram revealed severe stenosis of the bioprosthetic aortic valve.  CT Angio of the heart and coronaries 7/2022 within normal. Patient is now post op day 1 from Aortic valve replacement/Ascending Aorta Replacement with Dr. Stevenson      9/14 AVR / Ascending Aortic Replacement, 5u cryo, 2 PLT, 500 FEIBA, AV Wires, R pleural chest tube,Left pleural chest tube  Diaphragmatic mediastinal chest tube, Substernal mediastinal chest tube.  Patient extubated in evening.      9/15 Lopressor 12.5mg Q12 started in AM, Cardene gtt titrated off, Hydralazine 10mg Q8 added for afterload reduction, Amio ERP for afib prophylaxis.  ASA 81mg QD and Lovenox 40mg QD started.  PO Synthroid resumed for hx of Hashimoto's.  TX to SDU in evening.  Chest tubes/RIJ intro/bailey remain in place  9/16 Low dose diuretic today.  Ambulate, d/c ct'sReceived a call from radiology , small r pneumo, repeat pending.  Increase beta blocker as tolerated  9/17 VSS aldactone increased to 50 bid lopressor 12.5 bid maintain pacing wires transfer to floor disposition to home  9/18 VSS - no complaints.  pws d/c'd   9/19 VSS: rsr 60-70; continue diuretics; discharge home tue/ wed as per Dr. Stevenson

## 2022-09-19 NOTE — PROGRESS NOTE ADULT - SUBJECTIVE AND OBJECTIVE BOX
VITAL SIGNS    Telemetry:    Vital Signs Last 24 Hrs  T(C): 37.1 (22 @ 04:55), Max: 37.1 (22 @ 04:55)  T(F): 98.8 (22 @ 04:55), Max: 98.8 (22 @ 04:55)  HR: 79 (22 @ 09:16) (68 - 79)  BP: 101/68 (22 @ 09:16) (95/63 - 105/71)  RR: 18 (22 @ 04:55) (18 - 18)  SpO2: 96% (22 @ 09:16) (96% - 98%)             @ 07:01  -   @ 07:00  --------------------------------------------------------  IN: 297 mL / OUT: 1250 mL / NET: -953 mL     @ 07:01  -   @ 10:17  --------------------------------------------------------  IN: 120 mL / OUT: 1000 mL / NET: -880 mL       Daily     Daily Weight in k.7 (19 Sep 2022 08:33)  Admit Wt: Drug Dosing Weight  Height (cm): 167.6 (14 Sep 2022 06:52)  Weight (kg): 63.1 (14 Sep 2022 06:52)  BMI (kg/m2): 22.5 (14 Sep 2022 06:52)  BSA (m2): 1.71 (14 Sep 2022 06:52)      CAPILLARY BLOOD GLUCOSE              acetaminophen     Tablet .. 650 milliGRAM(s) Oral every 6 hours PRN  aspirin enteric coated 81 milliGRAM(s) Oral daily  atorvastatin 40 milliGRAM(s) Oral at bedtime  bisacodyl Suppository 10 milliGRAM(s) Rectal once  chlorhexidine 2% Cloths 1 Application(s) Topical daily  enoxaparin Injectable 40 milliGRAM(s) SubCutaneous every 24 hours  furosemide    Tablet 20 milliGRAM(s) Oral daily  gabapentin 100 milliGRAM(s) Oral every 8 hours  HYDROmorphone   Tablet 2 milliGRAM(s) Oral every 2 hours PRN  HYDROmorphone   Tablet 4 milliGRAM(s) Oral every 4 hours PRN  levothyroxine 88 MICROGram(s) Oral daily  melatonin 3 milliGRAM(s) Oral at bedtime  metoprolol tartrate 12.5 milliGRAM(s) Oral every 12 hours  pantoprazole    Tablet 40 milliGRAM(s) Oral before breakfast  polyethylene glycol 3350 17 Gram(s) Oral daily  senna 2 Tablet(s) Oral at bedtime  sodium chloride 0.9%. 1000 milliLiter(s) IV Continuous <Continuous>  spironolactone 50 milliGRAM(s) Oral every 12 hours      PHYSICAL EXAM    Subjective: "Hi.   Neurology: alert and oriented x 3, nonfocal, no gross deficits  CV : tele:  RSR  Sternal Wound :  CDI with dressing , Stable  Lungs: clear. RR easy, unlabored   Abdomen: soft, nontender, nondistended, positive bowel sounds, bowel movement   Neg N/V/D   :  pt voiding without difficulty   Extremities:   ORTEGA; edema, neg calf tenderness.   PPP bilaterally      PW:  Chest tubes:                 VITAL SIGNS    Telemetry:  rsr 60-70   Vital Signs Last 24 Hrs  T(C): 37.1 (22 @ 04:55), Max: 37.1 (22 @ 04:55)  T(F): 98.8 (22 @ 04:55), Max: 98.8 (22 @ 04:55)  HR: 79 (22 @ 09:16) (68 - 79)  BP: 101/68 (22 @ 09:16) (95/63 - 105/71)  RR: 18 (22 @ 04:55) (18 - 18)  SpO2: 96% (22 @ 09:16) (96% - 98%)             @ 07:01  -   @ 07:00  --------------------------------------------------------  IN: 297 mL / OUT: 1250 mL / NET: -953 mL     @ 07:01  -   @ 10:17  --------------------------------------------------------  IN: 120 mL / OUT: 1000 mL / NET: -880 mL       Daily     Daily Weight in k.7 (19 Sep 2022 08:33)  Admit Wt: Drug Dosing Weight  Height (cm): 167.6 (14 Sep 2022 06:52)  Weight (kg): 63.1 (14 Sep 2022 06:52)  BMI (kg/m2): 22.5 (14 Sep 2022 06:52)  BSA (m2): 1.71 (14 Sep 2022 06:52)           acetaminophen     Tablet .. 650 milliGRAM(s) Oral every 6 hours PRN  aspirin enteric coated 81 milliGRAM(s) Oral daily  atorvastatin 40 milliGRAM(s) Oral at bedtime  bisacodyl Suppository 10 milliGRAM(s) Rectal once  chlorhexidine 2% Cloths 1 Application(s) Topical daily  enoxaparin Injectable 40 milliGRAM(s) SubCutaneous every 24 hours  furosemide    Tablet 20 milliGRAM(s) Oral daily  gabapentin 100 milliGRAM(s) Oral every 8 hours  HYDROmorphone   Tablet 2 milliGRAM(s) Oral every 2 hours PRN  HYDROmorphone   Tablet 4 milliGRAM(s) Oral every 4 hours PRN  levothyroxine 88 MICROGram(s) Oral daily  melatonin 3 milliGRAM(s) Oral at bedtime  metoprolol tartrate 12.5 milliGRAM(s) Oral every 12 hours  pantoprazole    Tablet 40 milliGRAM(s) Oral before breakfast  polyethylene glycol 3350 17 Gram(s) Oral daily  senna 2 Tablet(s) Oral at bedtime  sodium chloride 0.9%. 1000 milliLiter(s) IV Continuous <Continuous>  spironolactone 50 milliGRAM(s) Oral every 12 hours      PHYSICAL EXAM    Subjective: "I just walked."   Neurology: alert and oriented x 3, nonfocal, no gross deficits  CV : tele:  RSR 60-70  Sternal Wound :  CDI DAMIR- sternum stable  Lungs: clear. RR easy, unlabored   Abdomen: soft, nontender, nondistended, positive bowel sounds, + bowel movement;  Neg N/V/D   :  pt voiding without difficulty   Extremities:   ORTEGA; trace LE edema, neg calf tenderness.   PPP bilaterally      PW: no   Chest tubes: none

## 2022-09-19 NOTE — PROGRESS NOTE ADULT - PROBLEM SELECTOR PLAN 1
9/14 AVR / Ascending Aortic Replacement  9/15 TX to SDU  -Continue Lopressor 12.5mg BID and titrate as tolerated  Lovenox 40 qd   -Continue ASA 81mg/  diuresis lasix 20 mg po an aldactone 50 bid  + PW VVI 50/10  -Patient encouraged to get OOB to chair as tolerated, increase ambulation and perform coughing/deep breathing exercises with heart pillow brace.  dispostion to home 9/14 AVR / Ascending Aortic Replacement  continue postop care   Continue Lopressor 12.5mg BID   diuresis lasix 20 mg po an aldactone 50 bid  pulm toilet  pain management  bowel regimen  increase activity as tolerated  Discharge planning- home tue/ wed as per Dr. Stevenson

## 2022-09-20 ENCOUNTER — TRANSCRIPTION ENCOUNTER (OUTPATIENT)
Age: 47
End: 2022-09-20

## 2022-09-20 VITALS — WEIGHT: 132.5 LBS

## 2022-09-20 LAB
ALBUMIN SERPL ELPH-MCNC: 4.3 G/DL — SIGNIFICANT CHANGE UP (ref 3.3–5)
ALP SERPL-CCNC: 44 U/L — SIGNIFICANT CHANGE UP (ref 40–120)
ALT FLD-CCNC: 23 U/L — SIGNIFICANT CHANGE UP (ref 10–45)
ANION GAP SERPL CALC-SCNC: 13 MMOL/L — SIGNIFICANT CHANGE UP (ref 5–17)
AST SERPL-CCNC: 20 U/L — SIGNIFICANT CHANGE UP (ref 10–40)
BILIRUB SERPL-MCNC: 0.5 MG/DL — SIGNIFICANT CHANGE UP (ref 0.2–1.2)
BUN SERPL-MCNC: 17 MG/DL — SIGNIFICANT CHANGE UP (ref 7–23)
CALCIUM SERPL-MCNC: 9.2 MG/DL — SIGNIFICANT CHANGE UP (ref 8.4–10.5)
CHLORIDE SERPL-SCNC: 98 MMOL/L — SIGNIFICANT CHANGE UP (ref 96–108)
CO2 SERPL-SCNC: 25 MMOL/L — SIGNIFICANT CHANGE UP (ref 22–31)
CREAT SERPL-MCNC: 0.72 MG/DL — SIGNIFICANT CHANGE UP (ref 0.5–1.3)
EGFR: 104 ML/MIN/1.73M2 — SIGNIFICANT CHANGE UP
GLUCOSE SERPL-MCNC: 94 MG/DL — SIGNIFICANT CHANGE UP (ref 70–99)
HCT VFR BLD CALC: 37 % — SIGNIFICANT CHANGE UP (ref 34.5–45)
HGB BLD-MCNC: 12.6 G/DL — SIGNIFICANT CHANGE UP (ref 11.5–15.5)
MCHC RBC-ENTMCNC: 31 PG — SIGNIFICANT CHANGE UP (ref 27–34)
MCHC RBC-ENTMCNC: 34.1 GM/DL — SIGNIFICANT CHANGE UP (ref 32–36)
MCV RBC AUTO: 91.1 FL — SIGNIFICANT CHANGE UP (ref 80–100)
NRBC # BLD: 0 /100 WBCS — SIGNIFICANT CHANGE UP (ref 0–0)
PLATELET # BLD AUTO: 224 K/UL — SIGNIFICANT CHANGE UP (ref 150–400)
POTASSIUM SERPL-MCNC: 4.1 MMOL/L — SIGNIFICANT CHANGE UP (ref 3.5–5.3)
POTASSIUM SERPL-SCNC: 4.1 MMOL/L — SIGNIFICANT CHANGE UP (ref 3.5–5.3)
PROT SERPL-MCNC: 7.1 G/DL — SIGNIFICANT CHANGE UP (ref 6–8.3)
RBC # BLD: 4.06 M/UL — SIGNIFICANT CHANGE UP (ref 3.8–5.2)
RBC # FLD: 12.3 % — SIGNIFICANT CHANGE UP (ref 10.3–14.5)
SODIUM SERPL-SCNC: 136 MMOL/L — SIGNIFICANT CHANGE UP (ref 135–145)
WBC # BLD: 6 K/UL — SIGNIFICANT CHANGE UP (ref 3.8–10.5)
WBC # FLD AUTO: 6 K/UL — SIGNIFICANT CHANGE UP (ref 3.8–10.5)

## 2022-09-20 PROCEDURE — 82962 GLUCOSE BLOOD TEST: CPT

## 2022-09-20 PROCEDURE — 94002 VENT MGMT INPAT INIT DAY: CPT

## 2022-09-20 PROCEDURE — C1889: CPT

## 2022-09-20 PROCEDURE — P9037: CPT

## 2022-09-20 PROCEDURE — 81025 URINE PREGNANCY TEST: CPT

## 2022-09-20 PROCEDURE — P9045: CPT

## 2022-09-20 PROCEDURE — 97535 SELF CARE MNGMENT TRAINING: CPT

## 2022-09-20 PROCEDURE — 83735 ASSAY OF MAGNESIUM: CPT

## 2022-09-20 PROCEDURE — 86923 COMPATIBILITY TEST ELECTRIC: CPT

## 2022-09-20 PROCEDURE — 85520 HEPARIN ASSAY: CPT

## 2022-09-20 PROCEDURE — 82330 ASSAY OF CALCIUM: CPT

## 2022-09-20 PROCEDURE — 31720 CLEARANCE OF AIRWAYS: CPT

## 2022-09-20 PROCEDURE — 36430 TRANSFUSION BLD/BLD COMPNT: CPT

## 2022-09-20 PROCEDURE — 85014 HEMATOCRIT: CPT

## 2022-09-20 PROCEDURE — 86965 POOLING BLOOD PLATELETS: CPT

## 2022-09-20 PROCEDURE — 71045 X-RAY EXAM CHEST 1 VIEW: CPT

## 2022-09-20 PROCEDURE — 97161 PT EVAL LOW COMPLEX 20 MIN: CPT

## 2022-09-20 PROCEDURE — 36415 COLL VENOUS BLD VENIPUNCTURE: CPT

## 2022-09-20 PROCEDURE — 82947 ASSAY GLUCOSE BLOOD QUANT: CPT

## 2022-09-20 PROCEDURE — C1751: CPT

## 2022-09-20 PROCEDURE — 85730 THROMBOPLASTIN TIME PARTIAL: CPT

## 2022-09-20 PROCEDURE — 82803 BLOOD GASES ANY COMBINATION: CPT

## 2022-09-20 PROCEDURE — 83605 ASSAY OF LACTIC ACID: CPT

## 2022-09-20 PROCEDURE — P9100: CPT

## 2022-09-20 PROCEDURE — 85018 HEMOGLOBIN: CPT

## 2022-09-20 PROCEDURE — C1768: CPT

## 2022-09-20 PROCEDURE — 84100 ASSAY OF PHOSPHORUS: CPT

## 2022-09-20 PROCEDURE — 85610 PROTHROMBIN TIME: CPT

## 2022-09-20 PROCEDURE — 82565 ASSAY OF CREATININE: CPT

## 2022-09-20 PROCEDURE — 85027 COMPLETE CBC AUTOMATED: CPT

## 2022-09-20 PROCEDURE — 82435 ASSAY OF BLOOD CHLORIDE: CPT

## 2022-09-20 PROCEDURE — 84132 ASSAY OF SERUM POTASSIUM: CPT

## 2022-09-20 PROCEDURE — 88304 TISSUE EXAM BY PATHOLOGIST: CPT

## 2022-09-20 PROCEDURE — 97116 GAIT TRAINING THERAPY: CPT

## 2022-09-20 PROCEDURE — 85396 CLOTTING ASSAY WHOLE BLOOD: CPT

## 2022-09-20 PROCEDURE — 80053 COMPREHEN METABOLIC PANEL: CPT

## 2022-09-20 PROCEDURE — 97530 THERAPEUTIC ACTIVITIES: CPT

## 2022-09-20 PROCEDURE — 93005 ELECTROCARDIOGRAM TRACING: CPT

## 2022-09-20 PROCEDURE — 85025 COMPLETE CBC W/AUTO DIFF WBC: CPT

## 2022-09-20 PROCEDURE — P9012: CPT

## 2022-09-20 PROCEDURE — 80048 BASIC METABOLIC PNL TOTAL CA: CPT

## 2022-09-20 PROCEDURE — 86891 AUTOLOGOUS BLOOD OP SALVAGE: CPT

## 2022-09-20 PROCEDURE — C1769: CPT

## 2022-09-20 PROCEDURE — 84295 ASSAY OF SERUM SODIUM: CPT

## 2022-09-20 PROCEDURE — P9047: CPT

## 2022-09-20 PROCEDURE — 85384 FIBRINOGEN ACTIVITY: CPT

## 2022-09-20 PROCEDURE — 97166 OT EVAL MOD COMPLEX 45 MIN: CPT

## 2022-09-20 PROCEDURE — 88300 SURGICAL PATH GROSS: CPT

## 2022-09-20 RX ORDER — ASPIRIN/CALCIUM CARB/MAGNESIUM 324 MG
1 TABLET ORAL
Qty: 30 | Refills: 0
Start: 2022-09-20 | End: 2022-10-19

## 2022-09-20 RX ORDER — LEVOTHYROXINE SODIUM 125 MCG
1 TABLET ORAL
Qty: 0 | Refills: 0 | DISCHARGE

## 2022-09-20 RX ORDER — SENNA PLUS 8.6 MG/1
2 TABLET ORAL
Qty: 20 | Refills: 0
Start: 2022-09-20 | End: 2022-09-29

## 2022-09-20 RX ORDER — ACETAMINOPHEN 500 MG
2 TABLET ORAL
Qty: 0 | Refills: 0 | DISCHARGE
Start: 2022-09-20

## 2022-09-20 RX ORDER — ATORVASTATIN CALCIUM 80 MG/1
1 TABLET, FILM COATED ORAL
Qty: 30 | Refills: 0
Start: 2022-09-20 | End: 2022-10-19

## 2022-09-20 RX ORDER — PANTOPRAZOLE SODIUM 20 MG/1
1 TABLET, DELAYED RELEASE ORAL
Qty: 14 | Refills: 0
Start: 2022-09-20 | End: 2022-10-03

## 2022-09-20 RX ORDER — METOPROLOL TARTRATE 50 MG
0.5 TABLET ORAL
Qty: 0.5 | Refills: 0
Start: 2022-09-20 | End: 2022-10-19

## 2022-09-20 RX ORDER — HYDROMORPHONE HYDROCHLORIDE 2 MG/ML
1 INJECTION INTRAMUSCULAR; INTRAVENOUS; SUBCUTANEOUS
Qty: 20 | Refills: 0
Start: 2022-09-20 | End: 2022-09-24

## 2022-09-20 RX ORDER — LEVOTHYROXINE SODIUM 125 MCG
1 TABLET ORAL
Qty: 0 | Refills: 0 | DISCHARGE
Start: 2022-09-20

## 2022-09-20 RX ADMIN — POLYETHYLENE GLYCOL 3350 17 GRAM(S): 17 POWDER, FOR SOLUTION ORAL at 08:45

## 2022-09-20 RX ADMIN — Medication 81 MILLIGRAM(S): at 08:45

## 2022-09-20 RX ADMIN — HYDROMORPHONE HYDROCHLORIDE 2 MILLIGRAM(S): 2 INJECTION INTRAMUSCULAR; INTRAVENOUS; SUBCUTANEOUS at 06:33

## 2022-09-20 RX ADMIN — Medication 88 MICROGRAM(S): at 05:43

## 2022-09-20 RX ADMIN — CHLORHEXIDINE GLUCONATE 1 APPLICATION(S): 213 SOLUTION TOPICAL at 11:05

## 2022-09-20 RX ADMIN — PANTOPRAZOLE SODIUM 40 MILLIGRAM(S): 20 TABLET, DELAYED RELEASE ORAL at 05:43

## 2022-09-20 RX ADMIN — HYDROMORPHONE HYDROCHLORIDE 2 MILLIGRAM(S): 2 INJECTION INTRAMUSCULAR; INTRAVENOUS; SUBCUTANEOUS at 05:48

## 2022-09-20 RX ADMIN — SPIRONOLACTONE 50 MILLIGRAM(S): 25 TABLET, FILM COATED ORAL at 05:42

## 2022-09-20 RX ADMIN — Medication 20 MILLIGRAM(S): at 05:48

## 2022-09-20 RX ADMIN — Medication 12.5 MILLIGRAM(S): at 05:43

## 2022-09-20 NOTE — DISCHARGE NOTE PROVIDER - HOSPITAL COURSE
Patient is a 47 yo female PMH asthma - well controlled - no meds,  Hashimoto's controlled with Synthroid, bicuspid aortic valve - s/p AVR 2008, Who had reports  of SOB, tiredness, dizziness for about a year, last echocardiogram revealed severe stenosis of the bioprosthetic aortic valve.  CT Angio of the heart and coronaries 7/2022 within normal. Patient is now post op day 1 from Aortic valve replacement/Ascending Aorta Replacement with Dr. Stevenson      9/14 AVR / Ascending Aortic Replacement, 5u cryo, 2 PLT, 500 FEIBA, AV Wires, R pleural chest tube,Left pleural chest tube  Diaphragmatic mediastinal chest tube, Substernal mediastinal chest tube.  Patient extubated in evening.      9/15 Lopressor 12.5mg Q12 started in AM, Cardene gtt titrated off, Hydralazine 10mg Q8 added for afterload reduction, Amio ERP for afib prophylaxis.  ASA 81mg QD and Lovenox 40mg QD started.  PO Synthroid resumed for hx of Hashimoto's.  TX to SDU in evening.  Chest tubes/RIJ intro/bailey remain in place  9/16 Low dose diuretic today.  Ambulate, d/c ct'sReceived a call from radiology , small r pneumo, repeat pending.  Increase beta blocker as tolerated  9/17 VSS aldactone increased to 50 bid lopressor 12.5 bid maintain pacing wires transfer to floor disposition to home  9/18 VSS - no complaints.  pws d/c'd   9/19 VSS: rsr 60-70; continue diuretics; discharge home tue/ wed as per Dr. Stevenson   9/20 VSS -cleared for d/c home today

## 2022-09-20 NOTE — DISCHARGE NOTE PROVIDER - NSDCMRMEDTOKEN_GEN_ALL_CORE_FT
acetaminophen: 2 tab(s) orally every 6 hours, As Needed  for mild pain  aspirin 81 mg oral delayed release tablet: 1 tab(s) orally once a day  atorvastatin 40 mg oral tablet: 1 tab(s) orally once a day (at bedtime)  birth control pill: orally once a day (at bedtime)  HYDROmorphone 2 mg oral tablet: 1 tab(s) orally every 6 hours, As Needed -Moderate Pain (4 - 6) MDD:4  levothyroxine 88 mcg (0.088 mg) oral tablet: 1 tab(s) orally once a day  Metoprolol Tartrate 25 mg oral tablet: 0.5 tab(s) orally 2 times a day   pantoprazole 40 mg oral delayed release tablet: 1 tab(s) orally once a day (before a meal)  senna leaf extract oral tablet: 2 tab(s) orally once a day (at bedtime)

## 2022-09-20 NOTE — DISCHARGE NOTE PROVIDER - ATTENDING ATTESTATION STATEMENT
Other Diet Instructions
I have personally seen and examined the patient. I have collaborated with and supervised the

## 2022-09-20 NOTE — DISCHARGE NOTE PROVIDER - NSDCCPCAREPLAN_GEN_ALL_CORE_FT
PRINCIPAL DISCHARGE DIAGNOSIS  Diagnosis: S/P AVR (aortic valve replacement)  Assessment and Plan of Treatment: Refer to your Cardiac Surgery Do's & Dont's Fact Sheet  shower daily - wash your incisions with mild soap and water  ambulate 4-5 times a day  weigh yourself daily - report weight gain > 2.5 pounds overnight to your MD  follow up appt with DR. Jeremias Stevenson on Friday 9/30 @ 2:45pm  follow up appt with your Cardiologist and/or Primary Care MD in 2-3 weeks

## 2022-09-20 NOTE — DISCHARGE NOTE PROVIDER - CARE PROVIDERS DIRECT ADDRESSES
[email or web address]
within normal limits

## 2022-09-20 NOTE — DISCHARGE NOTE NURSING/CASE MANAGEMENT/SOCIAL WORK - PATIENT PORTAL LINK FT
You can access the FollowMyHealth Patient Portal offered by [facility] by registering at the following website: [email or web address]. By joining [employer]’s FollowMyHealth portal, you will also be able to view your health information using other applications (apps) compatible with our system.
DASH/TLC (sodium and cholesterol restricted diet)

## 2022-09-20 NOTE — DISCHARGE NOTE PROVIDER - PROVIDER TOKENS
PROVIDER:[TOKEN:[[id number]],SCHEDULEDAPPT:[09/29/2022],SCHEDULEDAPPTTIME:[02:45 PM],ESTABLISHEDPATIENT:[T]]

## 2022-09-20 NOTE — DISCHARGE NOTE PROVIDER - CARE PROVIDER_API CALL
Jeremias Stevenson)  Surgery; Surgical Critical Care; Thoracic and Cardiac Surgery  [address] [place] [address]  Phone: [phone]  Fax: [phone]  Established Patient  Scheduled Appointment: 09/29/2022 02:45 PM

## 2022-09-20 NOTE — DISCHARGE NOTE PROVIDER - NSDCPNSUBOBJ_GEN_ALL_CORE
PHYSICAL EXAM    Subjective: "I just walked."   Neurology: alert and oriented x 3, nonfocal, no gross deficits  CV : tele:  RSR 60-70  Sternal Wound :  CDI DAMIR- sternum stable  Lungs: clear. RR easy, unlabored   Abdomen: soft, nontender, nondistended, positive bowel sounds, + bowel movement;  Neg N/V/D   :  pt voiding without difficulty   Extremities:   ORTEGA; trace LE edema, neg calf tenderness.   PPP bilaterally

## 2022-09-20 NOTE — DISCHARGE NOTE NURSING/CASE MANAGEMENT/SOCIAL WORK - NSDCPEFALRISK_GEN_ALL_CORE
For information on Fall & Injury Prevention, visit: [email or web address] OR  [email or web address] OR  https://www.cdc.gov/steadi/patient.html

## 2022-09-21 ENCOUNTER — APPOINTMENT (OUTPATIENT)
Dept: CARE COORDINATION | Facility: HOME HEALTH | Age: 47
End: 2022-09-21

## 2022-09-21 PROCEDURE — 99024 POSTOP FOLLOW-UP VISIT: CPT

## 2022-09-21 RX ORDER — ASPIRIN 81 MG/1
81 TABLET, DELAYED RELEASE ORAL DAILY
Refills: 0 | Status: ACTIVE | COMMUNITY
Start: 2022-09-21

## 2022-09-21 RX ORDER — ATORVASTATIN CALCIUM 40 MG/1
40 TABLET, FILM COATED ORAL
Refills: 0 | Status: ACTIVE | COMMUNITY
Start: 2022-09-21

## 2022-09-21 RX ORDER — PANTOPRAZOLE SODIUM 40 MG/1
40 TABLET, DELAYED RELEASE ORAL DAILY
Refills: 0 | Status: ACTIVE | COMMUNITY
Start: 2022-09-21

## 2022-09-21 RX ORDER — SENNOSIDES 8.6 MG/1
8.6 TABLET, COATED ORAL
Refills: 0 | Status: ACTIVE | COMMUNITY
Start: 2022-09-21

## 2022-09-22 ENCOUNTER — APPOINTMENT (OUTPATIENT)
Dept: CARE COORDINATION | Facility: HOME HEALTH | Age: 47
End: 2022-09-22

## 2022-09-22 VITALS
HEART RATE: 89 BPM | SYSTOLIC BLOOD PRESSURE: 92 MMHG | BODY MASS INDEX: 20.82 KG/M2 | WEIGHT: 129 LBS | RESPIRATION RATE: 16 BRPM | OXYGEN SATURATION: 97 % | DIASTOLIC BLOOD PRESSURE: 62 MMHG

## 2022-09-22 PROCEDURE — 99024 POSTOP FOLLOW-UP VISIT: CPT

## 2022-09-22 RX ORDER — ACETAMINOPHEN 325 MG/1
325 TABLET ORAL EVERY 6 HOURS
Refills: 0 | Status: ACTIVE | COMMUNITY
Start: 2022-09-21

## 2022-09-22 RX ORDER — POLYETHYLENE GLYCOL 3350 17 G/17G
17 POWDER, FOR SOLUTION ORAL DAILY
Refills: 0 | Status: ACTIVE | COMMUNITY
Start: 2022-09-22

## 2022-09-22 NOTE — HISTORY OF PRESENT ILLNESS
[FreeTextEntry1] : Patient is a 47 yo female PMH asthma - well controlled - no meds, Hashimoto's\par controlled with Synthroid, bicuspid aortic valve - s/p AVR 2008, Who had\par reports of SOB, tiredness, dizziness for about a year, last echocardiogram\par revealed severe stenosis of the bioprosthetic aortic valve. CT Angio of the\par heart and coronaries 7/2022 within normal. Patient is now post op day 1 from\par Aortic valve replacement/Ascending Aorta Replacement with Dr. Stevenson\par \par \par 9/14 AVR / Ascending Aortic Replacement, 5u cryo, 2 PLT, 500 FEIBA, AV Wires, R\par pleural chest tube,Left pleural chest tube\par Diaphragmatic mediastinal chest tube, Substernal mediastinal chest tube.\par Patient extubated in evening.\par \par pt recovering well at home with  and 3 sons, good support\par education and emotional support provided\par all questions answered\par \par took 1/2 metoprolol last pm and  this am and feels better then yeseterday

## 2022-09-22 NOTE — PHYSICAL EXAM
[] : no respiratory distress [Respiration, Rhythm And Depth] : normal respiratory rhythm and effort [Auscultation Breath Sounds / Voice Sounds] : lungs were clear to auscultation bilaterally [Heart Rate And Rhythm] : heart rate was normal and rhythm regular [Heart Sounds] : normal S1 and S2 [FreeTextEntry1] : MSI,and CT sites without erythema, drainage or warmth, with edges well approximated.  Sternum stable. BLE no edema [Bowel Sounds] : normal bowel sounds [Abdomen Soft] : soft [Abdomen Tenderness] : non-tender

## 2022-09-22 NOTE — PHYSICAL EXAM
[FreeTextEntry1] : Upon visualization MT incision & CT incision CDI & DAMIR no bleeding noted, no edema noted

## 2022-09-22 NOTE — HISTORY OF PRESENT ILLNESS
[FreeTextEntry1] : FOLLOW YOUR HEART HOME VISIT-[facility]\par Telehealth Home Visit made to  Ms. Boles for post discharge transitional care management and post follow up.   Patient of Dr. Stevenson s/p MVR on 9/13/22.   Discharged on 9/20/22 from [facility]\par \par \par Mrs. Boles is a 46 year old female with a  PMH asthma - well controlled - no meds,  Hashimoto's controlled with Synthroid, bicuspid aortic valve - s/p AVR 2008, who had reports  of SOB, tiredness, dizziness for about a year, last echocardiogram revealed severe stenosis of the bioprosthetic aortic valve.  CT Angio of the heart and coronaries 7/2022 within normal. Patient is s/p Aortic valve replacement/Ascending Aorta Replacement with Dr. Stevenson on 9/13/22, in OR pt. received  5 units of cryo, 2 PLT, 500 FEIBA.  Post-op coarse  Amio ERP for afib prophylaxis.  On 9/16  small right pneumo noted on chest xray.   On 9/19 discharged to home on diuretics.  Pt. seen today for follow up s/p hospitalization.  Pt. weight today is 129 pounds.  She states she experienced nausea, sweating associated with chest discomfort & states she didn’t take her pain medication for more than 24 hours.  After reviewing medications, pt. &  not aware that she needed to take Lopressor .5mg, pt explained she took 2 doses of Lopressor 25mg which may have been associated with how she felt.  Pt. denies any other new complaints/issues at this time.   All medications reviewed as per d/c order with +teach back.   Patient has scheduled follow up appointment. Reminded of CN role and contact number was provided to the patient.  Advised to call with any questions/concerns, verbalized understanding.  Care navigator to see patient in home tomorrow.\par

## 2022-09-30 ENCOUNTER — APPOINTMENT (OUTPATIENT)
Dept: CARDIOTHORACIC SURGERY | Facility: CLINIC | Age: 47
End: 2022-09-30

## 2022-09-30 VITALS
DIASTOLIC BLOOD PRESSURE: 61 MMHG | RESPIRATION RATE: 14 BRPM | SYSTOLIC BLOOD PRESSURE: 94 MMHG | HEIGHT: 66 IN | BODY MASS INDEX: 20.89 KG/M2 | TEMPERATURE: 97.6 F | HEART RATE: 88 BPM | WEIGHT: 130 LBS | OXYGEN SATURATION: 99 %

## 2022-09-30 VITALS — HEART RATE: 70 BPM

## 2022-09-30 DIAGNOSIS — I95.9 HYPOTENSION, UNSPECIFIED: ICD-10-CM

## 2022-09-30 DIAGNOSIS — Z95.2 PRESENCE OF PROSTHETIC HEART VALVE: ICD-10-CM

## 2022-09-30 DIAGNOSIS — Z95.828 PRESENCE OF OTHER VASCULAR IMPLANTS AND GRAFTS: ICD-10-CM

## 2022-09-30 PROCEDURE — 99024 POSTOP FOLLOW-UP VISIT: CPT

## 2022-09-30 RX ORDER — HYDROMORPHONE HYDROCHLORIDE 2 MG/1
2 TABLET ORAL 3 TIMES DAILY
Qty: 10 | Refills: 0 | Status: ACTIVE | COMMUNITY
Start: 2022-09-21 | End: 1900-01-01

## 2022-09-30 RX ORDER — METOPROLOL TARTRATE 25 MG/1
25 TABLET, FILM COATED ORAL
Refills: 0 | Status: COMPLETED | COMMUNITY
Start: 2022-09-21 | End: 2022-09-30

## 2022-10-03 ENCOUNTER — OUTPATIENT (OUTPATIENT)
Dept: OUTPATIENT SERVICES | Facility: HOSPITAL | Age: 47
LOS: 1 days | End: 2022-10-03
Payer: COMMERCIAL

## 2022-10-03 DIAGNOSIS — Z87.74 PERSONAL HISTORY OF (CORRECTED) CONGENITAL MALFORMATIONS OF HEART AND CIRCULATORY SYSTEM: Chronic | ICD-10-CM

## 2022-10-03 DIAGNOSIS — Z98.891 HISTORY OF UTERINE SCAR FROM PREVIOUS SURGERY: Chronic | ICD-10-CM

## 2022-10-03 DIAGNOSIS — I35.0 NONRHEUMATIC AORTIC (VALVE) STENOSIS: ICD-10-CM

## 2022-10-03 PROCEDURE — 93306 TTE W/DOPPLER COMPLETE: CPT | Mod: 26

## 2022-10-03 PROCEDURE — 93306 TTE W/DOPPLER COMPLETE: CPT

## 2022-10-05 ENCOUNTER — APPOINTMENT (OUTPATIENT)
Dept: CARDIOTHORACIC SURGERY | Facility: CLINIC | Age: 47
End: 2022-10-05

## 2022-10-05 VITALS
WEIGHT: 127 LBS | BODY MASS INDEX: 20.41 KG/M2 | HEART RATE: 76 BPM | OXYGEN SATURATION: 97 % | SYSTOLIC BLOOD PRESSURE: 100 MMHG | DIASTOLIC BLOOD PRESSURE: 63 MMHG | HEIGHT: 66 IN | RESPIRATION RATE: 16 BRPM

## 2022-10-05 PROCEDURE — 99024 POSTOP FOLLOW-UP VISIT: CPT

## 2022-10-20 ENCOUNTER — TRANSCRIPTION ENCOUNTER (OUTPATIENT)
Age: 47
End: 2022-10-20

## 2022-10-24 ENCOUNTER — NON-APPOINTMENT (OUTPATIENT)
Age: 47
End: 2022-10-24

## 2023-02-08 NOTE — DISCHARGE NOTE PROVIDER - NSDCHHHOMEBOUNDOTHER_GEN_ALL_CORE_FT
s/p cardiac surgery Bactrim Counseling:  I discussed with the patient the risks of sulfa antibiotics including but not limited to GI upset, allergic reaction, drug rash, diarrhea, dizziness, photosensitivity, and yeast infections.  Rarely, more serious reactions can occur including but not limited to aplastic anemia, agranulocytosis, methemoglobinemia, blood dyscrasias, liver or kidney failure, lung infiltrates or desquamative/blistering drug rashes.

## 2023-03-16 NOTE — PATIENT PROFILE ADULT - NSTRANSFERBELONGINGSDISPO_GEN_A_NUR
Good Afternoon, Per Mateo Edmonds [facility]#[phone] she stated that Dr. Johnathon Spurling can call and initiate a P2P to be done on the MRI BRAIN (W+WO) (CPT=70553) in order for it to be Approved [facility]#[id number] [facility]#[phone].     Thanks,  [employer] given to family

## 2023-12-18 ENCOUNTER — NON-APPOINTMENT (OUTPATIENT)
Age: 48
End: 2023-12-18

## 2023-12-28 ENCOUNTER — APPOINTMENT (OUTPATIENT)
Dept: OBGYN | Facility: CLINIC | Age: 48
End: 2023-12-28
Payer: COMMERCIAL

## 2023-12-28 VITALS
DIASTOLIC BLOOD PRESSURE: 75 MMHG | HEIGHT: 66 IN | HEART RATE: 70 BPM | SYSTOLIC BLOOD PRESSURE: 108 MMHG | BODY MASS INDEX: 20.89 KG/M2 | WEIGHT: 130 LBS

## 2023-12-28 DIAGNOSIS — Z30.41 ENCOUNTER FOR SURVEILLANCE OF CONTRACEPTIVE PILLS: ICD-10-CM

## 2023-12-28 DIAGNOSIS — Z00.00 ENCOUNTER FOR GENERAL ADULT MEDICAL EXAMINATION W/OUT ABNORMAL FINDINGS: ICD-10-CM

## 2023-12-28 LAB
BILIRUB UR QL STRIP: NEGATIVE
CLARITY UR: CLEAR
COLLECTION METHOD: NORMAL
GLUCOSE UR-MCNC: NEGATIVE
HCG UR QL: 0 EU/DL
HEMOGLOBIN: 17.2
HGB UR QL STRIP.AUTO: NEGATIVE
KETONES UR-MCNC: NEGATIVE
LEUKOCYTE ESTERASE UR QL STRIP: NEGATIVE
NITRITE UR QL STRIP: NEGATIVE
PH UR STRIP: 7
PROT UR STRIP-MCNC: NEGATIVE
SP GR UR STRIP: 1

## 2023-12-28 PROCEDURE — 81003 URINALYSIS AUTO W/O SCOPE: CPT | Mod: QW

## 2023-12-28 PROCEDURE — 85018 HEMOGLOBIN: CPT | Mod: QW

## 2023-12-28 PROCEDURE — 99396 PREV VISIT EST AGE 40-64: CPT

## 2023-12-28 RX ORDER — NORETHINDRONE ACETATE AND ETHINYL ESTRADIOL AND FERROUS FUMARATE 1MG-20(21)
1-20 KIT ORAL DAILY
Qty: 3 | Refills: 3 | Status: ACTIVE | COMMUNITY
Start: 2023-01-17 | End: 1900-01-01

## 2023-12-28 NOTE — DISCUSSION/SUMMARY
[FreeTextEntry1] : pt states cardiology is aware she is on OC no smoking, RBAD . Discussed the risks of DVT and blood clots,strokes  good and bad side effects of the pill discussed and instructions on how to take pills and when to use back up.  Encouraged exercise ,  good diet filled with,plant based foods, calcium and vit.D.rtn 12months.  Discussed SBE, Discussed the NIH suggests minimum of 2.5 hours of exercise a week  Answered any questions she may have.

## 2023-12-28 NOTE — HISTORY OF PRESENT ILLNESS
[FreeTextEntry1] : 49 yo here for av, and on loestrin, doing well withno comp. gets no bleeding  [ColonoscopyDate] : 2 yrs ago  [TextBox_43] : 5 yr rtn [Currently Active] : currently active [Men] : men [Vaginal] : vaginal [No] : No

## 2024-01-04 LAB — CYTOLOGY CVX/VAG DOC THIN PREP: ABNORMAL

## 2025-02-03 ENCOUNTER — RX RENEWAL (OUTPATIENT)
Age: 50
End: 2025-02-03

## 2025-02-03 RX ORDER — NORETHINDRONE ACETATE AND ETHINYL ESTRADIOL AND FERROUS FUMARATE 1MG-20(21)
1-20 KIT ORAL
Qty: 1 | Refills: 0 | Status: ACTIVE | COMMUNITY
Start: 2025-02-03 | End: 1900-01-01

## 2025-03-18 PROBLEM — Z12.4 CERVICAL CANCER SCREENING: Status: ACTIVE | Noted: 2025-03-18

## 2025-03-18 PROBLEM — Z12.11 COLON CANCER SCREENING: Status: ACTIVE | Noted: 2025-03-18

## 2025-03-25 ENCOUNTER — APPOINTMENT (OUTPATIENT)
Dept: OBGYN | Facility: CLINIC | Age: 50
End: 2025-03-25
Payer: COMMERCIAL

## 2025-03-25 VITALS — BODY MASS INDEX: 20.89 KG/M2 | HEIGHT: 66 IN | WEIGHT: 130 LBS

## 2025-03-25 VITALS — SYSTOLIC BLOOD PRESSURE: 120 MMHG | DIASTOLIC BLOOD PRESSURE: 80 MMHG | HEART RATE: 62 BPM

## 2025-03-25 DIAGNOSIS — Z12.4 ENCOUNTER FOR SCREENING FOR MALIGNANT NEOPLASM OF CERVIX: ICD-10-CM

## 2025-03-25 DIAGNOSIS — Z12.11 ENCOUNTER FOR SCREENING FOR MALIGNANT NEOPLASM OF COLON: ICD-10-CM

## 2025-03-25 DIAGNOSIS — Z01.419 ENCOUNTER FOR GYNECOLOGICAL EXAMINATION (GENERAL) (ROUTINE) W/OUT ABNORMAL FINDINGS: ICD-10-CM

## 2025-03-25 PROCEDURE — 85018 HEMOGLOBIN: CPT | Mod: QW

## 2025-03-25 PROCEDURE — 99396 PREV VISIT EST AGE 40-64: CPT

## 2025-03-25 PROCEDURE — 81003 URINALYSIS AUTO W/O SCOPE: CPT | Mod: QW

## 2025-03-27 LAB
BILIRUB UR QL STRIP: NORMAL
CLARITY UR: CLEAR
COLLECTION METHOD: NORMAL
GLUCOSE UR-MCNC: NORMAL
HCG UR QL: 0.2 EU/DL
HEMOGLOBIN: 15.2
HGB UR QL STRIP.AUTO: NORMAL
KETONES UR-MCNC: NORMAL
LEUKOCYTE ESTERASE UR QL STRIP: NORMAL
NITRITE UR QL STRIP: NORMAL
PH UR STRIP: 7
PROT UR STRIP-MCNC: NORMAL
SP GR UR STRIP: 1.01

## 2025-04-01 LAB — CYTOLOGY CVX/VAG DOC THIN PREP: NORMAL

## (undated) DEVICE — CONNECTOR "Y" 1/2 X 3/8 X 3/8"

## (undated) DEVICE — Device

## (undated) DEVICE — TUBING TRUWAVE PRESSURE MALE/FEMALE 72"

## (undated) DEVICE — CATH IV SAFE BC 20G X 1.16" (PINK)

## (undated) DEVICE — SUMP PERICARDIAL 20FR 1/4" ADULT

## (undated) DEVICE — POSITIONER CARDIAC BUMP

## (undated) DEVICE — GLV 8 PROTEXIS ORTHO (CREAM)

## (undated) DEVICE — PACING CABLE (BROWN) A/V TEMP SCREW DOWN 12FT

## (undated) DEVICE — WARMING BLANKET DUO-THERM HYPER/HYPOTHERM ADULT

## (undated) DEVICE — DRSG STERISTRIPS 0.5 X 4"

## (undated) DEVICE — SYR LUER LOK 50CC

## (undated) DEVICE — GOWN TRIMAX LG

## (undated) DEVICE — SUT TICRON 5 30" KV-40

## (undated) DEVICE — SYR ASEPTO

## (undated) DEVICE — SUT ETHIBOND 2-0 4-30" RB-1 WHITE

## (undated) DEVICE — SUT POLYSORB 2-0 30" GS-21 UNDYED

## (undated) DEVICE — VISITEC 4X4

## (undated) DEVICE — CONNECTOR STRAIGHT 3/8 X 1/2"

## (undated) DEVICE — DRSG MAMMARY SUPPORT XL SIZE 5

## (undated) DEVICE — BLADE SCALPEL SAFETYLOCK #10

## (undated) DEVICE — SUT PROLENE 4-0 36" BB

## (undated) DEVICE — TUBING ATS SUCTION LINE

## (undated) DEVICE — SOL IRR POUR H2O 250ML

## (undated) DEVICE — DRAPE MAYO STAND 30"

## (undated) DEVICE — FILTER REINFUSION FOR SALVAGED BLOOD DISP

## (undated) DEVICE — SUT ETHIBOND 2-0 36" SH

## (undated) DEVICE — ELCTR BOVIE TIP BLADE VALLEYLAB 6.5"

## (undated) DEVICE — POSITIONER FOAM EGG CRATE ULNAR 2PCS (PINK)

## (undated) DEVICE — PREP CHLORAPREP HI-LITE ORANGE 26ML

## (undated) DEVICE — DRSG OPSITE 13.75 X 4"

## (undated) DEVICE — URETERAL CATH RED RUBBER 20FR (YELLOW)

## (undated) DEVICE — RIGID ADULT SUCKER

## (undated) DEVICE — STOPCOCK 4-WAY W SWIVEL MALE LUER LOCK NON VENTED RED CAP

## (undated) DEVICE — DRAPE SLUSH MACHINE 48" X 48"

## (undated) DEVICE — SUCTION YANKAUER NO CONTROL VENT

## (undated) DEVICE — SUT PROLENE 4-0 36" SH

## (undated) DEVICE — LAP PAD 18 X 18"

## (undated) DEVICE — SUT ETHIBOND 2-0 4-30" RB-1 GREEN

## (undated) DEVICE — NDL HYPO SAFE 18G X 1.5" (PINK)

## (undated) DEVICE — MULTIPLE PERFUSION SET FEMALE 1 INLET LEG W 4 LEGS 15" (BLUE/RED)

## (undated) DEVICE — SOL IRR POUR NS 0.9% 500ML

## (undated) DEVICE — SAW BLADE MICROAIRE STERNUM 1X34X9.4MM

## (undated) DEVICE — SUT PROLENE 4-0 36" RB-1

## (undated) DEVICE — BLADE SCALPEL SAFETYLOCK #15

## (undated) DEVICE — DEFIBRILLATOR PAD PRE-CONNECT ADULT/CHILD

## (undated) DEVICE — SUT BIOSYN 4-0 18" P-12

## (undated) DEVICE — SPONGE PEANUT AUTO COUNT

## (undated) DEVICE — GLV 8 PROTEXIS (WHITE)

## (undated) DEVICE — SUT PROLENE 5-0 30" RB-2

## (undated) DEVICE — CONNECTOR "Y" 1/2 X 1/2 X 3/8"

## (undated) DEVICE — DRAPE LIGHT HANDLE COVER (BLUE)

## (undated) DEVICE — FOLEY TRAY 16FR 5CC LF LUBRISIL ADVANCE TEMP CLOSED

## (undated) DEVICE — DRSG XEROFORM 1 X 8"

## (undated) DEVICE — DRSG TEGADERM 6"X8"

## (undated) DEVICE — FEEDING TUBE NG SUMP 16FR 48"

## (undated) DEVICE — SYR LUER LOK 3CC

## (undated) DEVICE — SUMP INTRACARDIAC 20FR 1/4" ADULT

## (undated) DEVICE — SOL NORMOSOL-R PH7.4 1000ML

## (undated) DEVICE — SENSOR MYOCARDIAL TEMP 15MM

## (undated) DEVICE — MARKING PEN W RULER

## (undated) DEVICE — SUT STAINLESS STEEL 5 18" SCC

## (undated) DEVICE — NDL COUNTER FOAM AND MAGNET 40-70

## (undated) DEVICE — DRAIN CHANNEL 32FR ROUND HUBLESS FULL FLUTED

## (undated) DEVICE — DRAIN RESERVOIR FOR JACKSON PRATT 100CC CARDINAL

## (undated) DEVICE — DRSG DERMABOND PRINEO 60CM

## (undated) DEVICE — BLADE SCALPEL SAFETYLOCK #11

## (undated) DEVICE — SUT GORETEX CV-4 (3-0) 36" TH-18

## (undated) DEVICE — PACK UNIVERSAL CARDIAC SUPPLEMNTAL B

## (undated) DEVICE — DRAPE SLUSH / WARMER 44 X 66"

## (undated) DEVICE — SYR LUER LOK 20CC

## (undated) DEVICE — VENTING ADAPTER "Y" (RED/BLUE) 7.5"

## (undated) DEVICE — TUBING KIT FAST START ATF 40

## (undated) DEVICE — SYR LUER LOK 30CC

## (undated) DEVICE — STAPLER SKIN VISI-STAT 35 WIDE

## (undated) DEVICE — BEAVER BLADE MINI SHARP ALL ROUND (BLUE)

## (undated) DEVICE — SAW BLADE MICROAIRE STERNUM 1.1X50X42MM

## (undated) DEVICE — SUT TICRON 2-0 36" CV-316 DA

## (undated) DEVICE — GLV 7.5 PROTEXIS (WHITE)

## (undated) DEVICE — NDL TAPR FR EYE 1/2 CIR 3

## (undated) DEVICE — SOL IRR BAG NS 0.9% 3000ML

## (undated) DEVICE — SUT PROLENE 3-0 36" SH

## (undated) DEVICE — SUT ETHIBOND EXCEL 3-0 30" V-5 PLDGT 5 GREEN 5 WHITE

## (undated) DEVICE — TUBING SUCTION 20FT

## (undated) DEVICE — URETERAL CATH RED RUBBER 8FR

## (undated) DEVICE — DRSG OPSITE 2.5 X 2"

## (undated) DEVICE — DRAPE 1/2 SHEET 40X57"

## (undated) DEVICE — SUT DOUBLE 6 WIRE STERNAL

## (undated) DEVICE — TOURNIQUET SET 12FR (1 RED, 1 BLUE, 1 SNARE) 7"

## (undated) DEVICE — SUT VICRYL 2-0 27" CT-1 UNDYED

## (undated) DEVICE — GOWN XXXL

## (undated) DEVICE — VENODYNE/SCD SLEEVE CALF MEDIUM

## (undated) DEVICE — DRAPE TOWEL BLUE 17" X 24"

## (undated) DEVICE — DRAIN PLEUROVAC

## (undated) DEVICE — SUT SOFSILK 2 60" TIES

## (undated) DEVICE — TUBING INSUFFLATION LAP FILTER 10FT

## (undated) DEVICE — DRAPE IOBAN 23" X 23"

## (undated) DEVICE — SUT SURGICAL STEEL 6 30" BP-1

## (undated) DEVICE — VESSEL LOOP EXTRA MAXI-BLUE 0.200" X 22"

## (undated) DEVICE — SUT POLYSORB 0 36" GS-25 UNDYED

## (undated) DEVICE — SUT POLYSORB 2 30" GS-26

## (undated) DEVICE — PACK UNIVERSAL CARDIAC

## (undated) DEVICE — SUT SOFSILK 0 30" V-20

## (undated) DEVICE — STRYKER INTERPULSE HANDPIECE W IRR SUCTION TUBE

## (undated) DEVICE — PHRENIC NERVE PAD MEDIUM

## (undated) DEVICE — GOWN TRIMAX XXL

## (undated) DEVICE — SUT VICRYL 0 36" CTX UNDYED

## (undated) DEVICE — GLV 6.5 PROTEXIS (WHITE)

## (undated) DEVICE — GLV 7.5 DERMAPRENE ULTRA

## (undated) DEVICE — GLV 8.5 PROTEXIS (WHITE)

## (undated) DEVICE — PACK CUSTOM W/INSPIRE OXYGENATOR

## (undated) DEVICE — SUT PLEDGET PRE PUNCH 4.8 X 9.5 X 1.5 MM

## (undated) DEVICE — SET PERF CARDIOPLEGIA 4 ARM STRL

## (undated) DEVICE — DRAIN CHANNEL 19FR ROUND FULL FLUTED

## (undated) DEVICE — ELCTR CAUTERY 2" LOOP TIP 2200 DEG FAHRENHEIT

## (undated) DEVICE — GLV 7 PROTEXIS (WHITE)